# Patient Record
Sex: MALE | Race: WHITE | Employment: OTHER | URBAN - METROPOLITAN AREA
[De-identification: names, ages, dates, MRNs, and addresses within clinical notes are randomized per-mention and may not be internally consistent; named-entity substitution may affect disease eponyms.]

---

## 2017-01-09 ENCOUNTER — ANESTHESIA EVENT (OUTPATIENT)
Dept: SURGERY | Age: 67
End: 2017-01-09
Payer: MEDICARE

## 2017-01-10 ENCOUNTER — SURGERY (OUTPATIENT)
Age: 67
End: 2017-01-10

## 2017-01-10 ENCOUNTER — ANESTHESIA (OUTPATIENT)
Dept: SURGERY | Age: 67
End: 2017-01-10
Payer: MEDICARE

## 2017-01-10 PROBLEM — Z87.19 HISTORY OF BARRETT'S ESOPHAGUS: Status: ACTIVE | Noted: 2017-01-10

## 2017-01-10 PROCEDURE — 74011250636 HC RX REV CODE- 250/636

## 2017-01-10 RX ORDER — PROPOFOL 10 MG/ML
INJECTION, EMULSION INTRAVENOUS AS NEEDED
Status: DISCONTINUED | OUTPATIENT
Start: 2017-01-10 | End: 2017-01-10 | Stop reason: HOSPADM

## 2017-01-10 RX ADMIN — PROPOFOL 50 MG: 10 INJECTION, EMULSION INTRAVENOUS at 14:44

## 2017-01-10 RX ADMIN — PROPOFOL 100 MG: 10 INJECTION, EMULSION INTRAVENOUS at 14:47

## 2017-01-10 RX ADMIN — PROPOFOL 100 MG: 10 INJECTION, EMULSION INTRAVENOUS at 14:41

## 2017-01-10 NOTE — ANESTHESIA PREPROCEDURE EVALUATION
Anesthetic History   No history of anesthetic complications            Review of Systems / Medical History  Patient summary reviewed, nursing notes reviewed and pertinent labs reviewed    Pulmonary  Within defined limits                 Neuro/Psych     seizures (associated with vasovagal episodes only)         Cardiovascular  Within defined limits                Exercise tolerance: >4 METS     GI/Hepatic/Renal     GERD: well controlled    Renal disease: stones       Endo/Other  Within defined limits           Other Findings   Comments: [place]           Physical Exam    Airway  Mallampati: I  TM Distance: 4 - 6 cm  Neck ROM: normal range of motion   Mouth opening: Normal     Cardiovascular    Rhythm: regular  Rate: normal      Pertinent negatives: No murmur   Dental  No notable dental hx       Pulmonary  Breath sounds clear to auscultation               Abdominal  GI exam deferred       Other Findings            Anesthetic Plan    ASA: 2  Anesthesia type: general and total IV anesthesia          Induction: Intravenous  Anesthetic plan and risks discussed with: Patient

## 2017-01-10 NOTE — ANESTHESIA POSTPROCEDURE EVALUATION
Post-Anesthesia Evaluation and Assessment    Patient: Bren Meraz MRN: [id number]  SSN: [id number]    YOB: 1950  Age: 78 y.o. Sex: male       Cardiovascular Function/Vital Signs  Visit Vitals    /77    Pulse (!) 57    Temp 36.6 °C (97.8 °F)    Resp 19    Ht 5' 7\" (1.702 m)    Wt 77.6 kg (171 lb)    SpO2 99%    BMI 26.78 kg/m2       Patient is status post general, total IV anesthesia anesthesia for Procedure(s):  ESOPHAGOGASTRODUODENOSCOPY (EGD) WITH BIOPSY. Nausea/Vomiting: None    Postoperative hydration reviewed and adequate. Pain:  Pain Scale 1: Numeric (0 - 10) (01/10/17 1528)  Pain Intensity 1: 0 (01/10/17 1528)   Managed    Neurological Status:   Neuro (WDL): Exceptions to WDL (01/10/17 1453)  Neuro  Neurologic State: Alert (01/10/17 1528)  LUE Motor Response: Purposeful (01/10/17 1528)  LLE Motor Response: Purposeful (01/10/17 1528)  RUE Motor Response: Purposeful (01/10/17 1528)  RLE Motor Response: Purposeful (01/10/17 1528)   At baseline    Mental Status and Level of Consciousness: Arousable    Pulmonary Status:   O2 Device: Nasal cannula (01/10/17 1456)   Adequate oxygenation and airway patent    Complications related to anesthesia: None    Post-anesthesia assessment completed.  No concerns    Signed By: Thai Kulkarni MD     January 10, 2017

## 2017-01-19 ENCOUNTER — OFFICE VISIT (OUTPATIENT)
Dept: SURGERY | Age: 67
End: 2017-01-19

## 2017-01-19 VITALS — BODY MASS INDEX: 26.84 KG/M2 | WEIGHT: 171 LBS | HEIGHT: 67 IN | RESPIRATION RATE: 20 BRPM

## 2017-01-19 DIAGNOSIS — K40.90 LEFT INGUINAL HERNIA: Primary | ICD-10-CM

## 2017-01-19 NOTE — MR AVS SNAPSHOT
Visit Information Date & Time Provider Department Dept. Phone Encounter #  
 1/19/2017  9:40 AM Ernie Booker MD [facility] [address] [phone] [id number] Upcoming Health Maintenance Date Due Hepatitis C Screening 1950 DTaP/Tdap/Td series (1 - Tdap) 9/12/1970 FOBT Q 1 YEAR AGE 50-75 9/12/1999 ZOSTER VACCINE AGE 60> 9/12/2009 GLAUCOMA SCREENING Q2Y 9/12/2014 Pneumococcal 65+ Low/Medium Risk (1 of 2 - PCV13) 9/12/2014 MEDICARE YEARLY EXAM 9/12/2014 INFLUENZA AGE 9 TO ADULT 8/1/2016 Allergies as of 1/19/2017  Review Complete On: 1/19/2017 By: Ernie Booker MD  
 No Known Allergies Current Immunizations  Never Reviewed No immunizations on file. Not reviewed this visit Vitals Resp Height(growth percentile) Weight(growth percentile) BMI Smoking Status 20 5' 7\" (1.702 m) 171 lb (77.6 kg) 26.78 kg/m2 Never Smoker BMI and BSA Data Body Mass Index Body Surface Area  
 26.78 kg/m 2 1.92 m 2 Preferred Pharmacy Pharmacy Name Phone [id number] [place] [address] [place] [address] [phone] Your Updated Medication List  
  
   
This list is accurate as of: 1/19/17 10:24 AM.  Always use your most recent med list.  
  
  
  
  
 aspirin delayed-release 81 mg tablet Take  by mouth every morning. cholecalciferol 400 unit Tab tablet Commonly known as:  VITAMIN D3 Take  by mouth daily. Citracal + D tablet Generic drug:  calcium citrate-vitamin D3 Take 1 Tab by mouth daily. DHEA PO Take 50 mg by mouth daily. multivitamin tablet Commonly known as:  ONE A DAY Take 1 Tab by mouth daily. omeprazole 20 mg capsule Commonly known as:  PRILOSEC Take 20 mg by mouth every morning. PROLIA 60 mg/mL injection Generic drug:  denosumab 60 mg by SubCUTAneous route. q6 months - 12/2016  
  
 red yeast rice extract 600 mg Cap Take 600 mg by mouth nightly. ZANTAC 150 mg tablet Generic drug:  raNITIdine Take 150 mg by mouth nightly. To-Do List   
 01/24/2017 8:00 AM  
  Appointment with MELISSA PAT ROOM P1 at [address] [place] [address] ([phone]) [facility] SERVICES! Dear Solange Rose: Thank you for requesting a Peeractive account. Our records indicate that you already have an active Peeractive account. You can access your account anytime at [email or web address] Did you know that you can access your hospital and ER discharge instructions at any time in Peeractive? You can also review all of your test results from your hospital stay or ER visit. Additional Information If you have questions, please visit the Frequently Asked Questions section of the Peeractive website at [email or web address] Remember, Peeractive is NOT to be used for urgent needs. For medical emergencies, dial 911. Now available from your iPhone and Android! Please provide this summary of care documentation to your next provider. Your primary care clinician is listed as [address]. If you have any questions after today's visit, please call [phone].

## 2017-01-19 NOTE — PROGRESS NOTES
HISTORY OF PRESENT ILLNESS  Bibi Pacheco is a 78 y.o. male.   HPI Comments:   Treated for Kidney stone by Dr. Argelia Byrd in March    Hx of R IHR    Left groin  Getting bigger    No nausea  Appetite ok  EGD last week with Dr. Sendy Francis +Vincent's, symptoms controlled with PPI          ____________________________________________________________________________  Patient presents with:  Possible Hernia: Seen at the request of Dr. Argelia Byrd for evaluation of possible hernia     Resp 20  Ht 5' 7\" (1.702 m)  Wt 77.6 kg (171 lb)  BMI 26.78 kg/m2  Past Medical History:    Acid indigestion                                              Calculus of kidney                                            GERD (gastroesophageal reflux disease)                        History of Vincent's esophagus                  1/10/2017       Comment:In the past, none 2014    History of kidney stones                        2012            Comment:has surgery 12/2012 and 3/2016    [place] (hard of hearing)                                           Comment:bilateral hearing aides    Osteopenia                                                      Comment:Dr Bina Rey gives him Prolia q6 months    Seizures ([address]                                                  Comment:after syncopal episodes; as of 1/9/17 pt states               this was \"4-5 years ago\"    Syncope, vasovagal                                              Comment:as of 1/9/17 pt states this was \"4-5 years ago\"  Past Surgical History:    HX APPENDECTOMY                                                HX KYPHOPLASTY                                   2011            Comment:[id number]    [address]            Comment:right inguinal    HX LITHOTRIPSY                                   12/2012, *    HX RETINAL DETACHMENT REPAIR                    Right 1981          HX CATARACT REMOVAL                             Bilateral 2015          SD NEUROLOGICAL PROCEDURE UNLISTED Right 06/03/2013      Comment:L4-5 Microdiscectomy  Social History    Marital status:              Spouse name:                       Years of education:                 Number of children:               Social History Main Topics    Smoking status: Never Smoker                                                                Smokeless status: Never Used                        Alcohol use: Yes           4.2 oz/week       7 Cans of beer per week       Comment: as of 1/9/17 pt reports 1 beer daily    Drug use: No              Review of patient's family history indicates:    Heart Disease                  Father                    Malignant Hyperthermia         Neg Hx                    Pseudocholinesterase Deficien* Neg Hx                    Delayed Awakening              Neg Hx                    Post-op Nausea/Vomiting        Neg Hx                    Emergence Delirium             Neg Hx                    Post-op Cognitive Dysfunction  Neg Hx                    Current Outpatient Prescriptions:  omeprazole (PRILOSEC) 20 mg capsule, Take 20 mg by mouth every morning. aspirin delayed-release 81 mg tablet, Take  by mouth every morning.  multivitamin (ONE A DAY) tablet, Take 1 Tab by mouth daily. calcium citrate-vitamin D3 (CITRACAL + D) tablet, Take 1 Tab by mouth daily. cholecalciferol (VITAMIN D3) 400 unit tab tablet, Take  by mouth daily. red yeast rice extract 600 mg cap, Take 600 mg by mouth nightly. denosumab (PROLIA) 60 mg/mL injection, 60 mg by SubCUTAneous route. q6 months - 12/2016  ranitidine (ZANTAC) 150 mg tablet, Take 150 mg by mouth nightly. PRASTERONE, DHEA, (DHEA PO), Take 50 mg by mouth daily. No current facility-administered medications for this visit. Allergies: No Known Allergies  _____________________________________________________________________________      Possible Hernia   The history is provided by the patient. This is a chronic problem.  The current episode started more than 1 week ago. The problem occurs constantly. The problem has been gradually worsening. Pertinent negatives include no chest pain, no abdominal pain, no headaches and no shortness of breath. The symptoms are aggravated by exertion. The symptoms are relieved by rest. The treatment provided no relief. Review of Systems   Constitutional: Negative for chills, fever and weight loss. HENT: Negative for ear pain. Eyes: Negative for pain. Respiratory: Negative for shortness of breath. Cardiovascular: Negative for chest pain. Gastrointestinal: Negative for abdominal pain and blood in stool. Genitourinary: Negative for hematuria. Musculoskeletal: Negative for joint pain. Skin: Negative for rash. Neurological: Negative for dizziness, focal weakness, seizures and headaches. Endo/Heme/Allergies: Does not bruise/bleed easily. Psychiatric/Behavioral: The patient does not have insomnia. Physical Exam   Constitutional: He is oriented to person, place, and time. He appears well-developed and well-nourished. No distress. HENT:   Head: Normocephalic. Mouth/Throat: No oropharyngeal exudate. Eyes: Pupils are equal, round, and reactive to light. No scleral icterus. Neck: Neck supple. No tracheal deviation present. Cardiovascular: Normal rate, regular rhythm and normal heart sounds. Pulmonary/Chest: Effort normal and breath sounds normal. He has no wheezes. Abdominal: Soft. He exhibits no distension and no mass. There is no hepatosplenomegaly. There is no tenderness. There is no CVA tenderness. A hernia is present. Hernia confirmed positive in the left inguinal area. Hernia confirmed negative in the right inguinal area. Musculoskeletal: Normal range of motion. He exhibits no edema. Lymphadenopathy:     He has no cervical adenopathy. Neurological: He is alert and oriented to person, place, and time. Skin: Skin is warm. No rash noted. No erythema.    Psychiatric: He has a normal mood and affect. His behavior is normal.       ASSESSMENT and PLAN    ICD-10-CM ICD-9-CM    1. Left inguinal hernia K40.90 550.90        Rissa Kirkland is having symptoms. I have recommended to him that we proceed with surgery. I had an extensive discussion with him regarding the risks, benefits, and alternatives of proceeding with a Laparoscopic Left, Possible Bilateral Inguinal Hernia Repair with Mesh, Robot Assisted. Risks,benefits, and alternatives were discussed including the risk of anesthesia, bleeding, infection, including mesh infection, chronic orchialgia, neuralgia, other pain syndromes, testicular ischemia, conversion to open, injury to bowel, and recurrence were discussed. I reviewed with Rissa Kirkland his increased risk of bleeding secondary to Asprin use. I have asked him to discontinue the Asprin for 2 days prior to surgery to reduce this risk. He is in agreement to proceed. All questions were answered. Rissa Kirkland will undergo preoperative evaluation and will proceed provided he is medically stable. We will schedule him at his earliest convenience. Thank you for this consult.

## 2017-01-19 NOTE — LETTER
1/19/2017 10:05 AM 
 
Mr. Kenya Chang 87 [address] [address] Surgery Instruction Sheet You have been scheduled for surgery on 1/31/17 at 9;15am at [facility]. Please report to the Surgery Center at 7;15am, this is approximately 2 hours prior to your surgery time. The Surgery Center is located on the [address] [place] [address] Street side of the hospital, just next to the Emergency Room. Reserved parking is available and  parking if lot is full. You will need to have a Pre-op Visit prior to your surgery. Report to the Surgery Center on 1/24/17 at 8:00am .  Bring a list of medications and your insurance cards with you. You may eat/drink prior to this visit. Call your physician immediately if you notice a change in your health between the time you saw your physician and the day of surgery. If you take a blood thinner, please let us know. Call your ordering Doctor to make sure you can stop taking it prior to your surgery. STOP YOUR ASPIRIN 2 DAYS PRIOR TO SURGERY. DO NOT TAKE  IBUPROFEN, ADVIL, MOTRIN, ALEVE, EXCEDRIN, BC POWDER, GOODIES, FISH OIL OR ANY MEDICATION CONTAINING ASPIRIN 5 DAYS PRIOR TO YOUR SURGERY. MAY TAKE TYLENOL. Eat a light dinner the evening before your surgery. DO NOT EAT OR DRINK ANYTHING AFTER MIDNIGHT THE NIGHT BEFORE YOUR SURGERY. This includes water, chewing gum, lifesavers, etc.  The Pre op nurse will check with you about any medication that you may need to take the morning of surgery. Shower with a new bar of anti-bacterial soap (Dial, Safeguard) or solution given to you by Pre-op, the night before surgery. Do not use lotion, powder, deodorant on the skin after showering.   Wear loose, comfortable clothing the day of surgery and bring a container to store your contacts, eyeglasses, dentures, hearing aid, etc.  Do not bring money, valuables, jewelry, etc. to the hospital.   
 
 If you are having outpatient surgery, someone must come with you the morning of surgery to drive you home. You can not drive for 24 hours after any anesthesia. Sometimes it is necessary to stay overnight and leave the next morning. This is still considered outpatient for most insurance deductibles. Someone will still need to drive you home. If you have questions or concerns, please feel free to call /Tana at [phone]. If you need to cancel your surgery, please call as soon as possible. Sincerely, Álvaro Pryor MD

## 2017-01-24 ENCOUNTER — HOSPITAL ENCOUNTER (OUTPATIENT)
Dept: PREADMISSION TESTING | Age: 67
Discharge: HOME | End: 2017-01-24
Attending: SURGERY
Payer: MEDICARE

## 2017-01-24 VITALS
HEART RATE: 57 BPM | TEMPERATURE: 97.9 F | BODY MASS INDEX: 24.88 KG/M2 | OXYGEN SATURATION: 100 % | WEIGHT: 158.51 LBS | DIASTOLIC BLOOD PRESSURE: 71 MMHG | HEIGHT: 67 IN | SYSTOLIC BLOOD PRESSURE: 98 MMHG | RESPIRATION RATE: 18 BRPM

## 2017-01-24 LAB
ANION GAP BLD CALC-SCNC: 7 MMOL/L (ref 5–15)
APPEARANCE UR: CLEAR
ATRIAL RATE: 52 BPM
BACTERIA URNS QL MICRO: NEGATIVE /HPF
BILIRUB UR QL: NEGATIVE
BUN SERPL-MCNC: 12 MG/DL (ref 6–20)
BUN/CREAT SERPL: 12 (ref 12–20)
CALCIUM SERPL-MCNC: 9.1 MG/DL (ref 8.5–10.1)
CALCULATED P AXIS, ECG09: 43 DEGREES
CALCULATED R AXIS, ECG10: -26 DEGREES
CALCULATED T AXIS, ECG11: 40 DEGREES
CHLORIDE SERPL-SCNC: 108 MMOL/L (ref 97–108)
CO2 SERPL-SCNC: 28 MMOL/L (ref 21–32)
COLOR UR: NORMAL
CREAT SERPL-MCNC: 1.03 MG/DL (ref 0.7–1.3)
DIAGNOSIS, 93000: NORMAL
EPITH CASTS URNS QL MICRO: NORMAL /LPF
ERYTHROCYTE [DISTWIDTH] IN BLOOD BY AUTOMATED COUNT: 13.4 % (ref 11.5–14.5)
GLUCOSE SERPL-MCNC: 93 MG/DL (ref 65–100)
GLUCOSE UR STRIP.AUTO-MCNC: NEGATIVE MG/DL
HCT VFR BLD AUTO: 45.5 % (ref 36.6–50.3)
HGB BLD-MCNC: 15.8 G/DL (ref 12.1–17)
HGB UR QL STRIP: NEGATIVE
HYALINE CASTS URNS QL MICRO: NORMAL /LPF (ref 0–5)
KETONES UR QL STRIP.AUTO: NEGATIVE MG/DL
LEUKOCYTE ESTERASE UR QL STRIP.AUTO: NEGATIVE
MCH RBC QN AUTO: 32 PG (ref 26–34)
MCHC RBC AUTO-ENTMCNC: 34.7 G/DL (ref 30–36.5)
MCV RBC AUTO: 92.3 FL (ref 80–99)
NITRITE UR QL STRIP.AUTO: NEGATIVE
P-R INTERVAL, ECG05: 174 MS
PH UR STRIP: 7 [PH] (ref 5–8)
PLATELET # BLD AUTO: 205 K/UL (ref 150–400)
POTASSIUM SERPL-SCNC: 4.1 MMOL/L (ref 3.5–5.1)
PROT UR STRIP-MCNC: NEGATIVE MG/DL
Q-T INTERVAL, ECG07: 452 MS
QRS DURATION, ECG06: 94 MS
QTC CALCULATION (BEZET), ECG08: 420 MS
RBC # BLD AUTO: 4.93 M/UL (ref 4.1–5.7)
RBC #/AREA URNS HPF: NORMAL /HPF (ref 0–5)
SODIUM SERPL-SCNC: 143 MMOL/L (ref 136–145)
SP GR UR REFRACTOMETRY: 1.02 (ref 1–1.03)
UA: UC IF INDICATED,UAUC: NORMAL
UROBILINOGEN UR QL STRIP.AUTO: 1 EU/DL (ref 0.2–1)
VENTRICULAR RATE, ECG03: 52 BPM
WBC # BLD AUTO: 6.8 K/UL (ref 4.1–11.1)
WBC URNS QL MICRO: NORMAL /HPF (ref 0–4)

## 2017-01-24 PROCEDURE — 81001 URINALYSIS AUTO W/SCOPE: CPT | Performed by: SURGERY

## 2017-01-24 PROCEDURE — 93005 ELECTROCARDIOGRAM TRACING: CPT

## 2017-01-24 PROCEDURE — 85027 COMPLETE CBC AUTOMATED: CPT | Performed by: ANESTHESIOLOGY

## 2017-01-24 PROCEDURE — 36415 COLL VENOUS BLD VENIPUNCTURE: CPT | Performed by: ANESTHESIOLOGY

## 2017-01-24 PROCEDURE — 80048 BASIC METABOLIC PNL TOTAL CA: CPT | Performed by: ANESTHESIOLOGY

## 2017-01-24 RX ORDER — CEFAZOLIN SODIUM IN 0.9 % NACL 2 G/100 ML
2 PLASTIC BAG, INJECTION (ML) INTRAVENOUS ONCE
Status: CANCELLED | OUTPATIENT
Start: 2017-01-31 | End: 2017-01-31

## 2017-01-24 RX ORDER — SODIUM CHLORIDE, SODIUM LACTATE, POTASSIUM CHLORIDE, CALCIUM CHLORIDE 600; 310; 30; 20 MG/100ML; MG/100ML; MG/100ML; MG/100ML
25 INJECTION, SOLUTION INTRAVENOUS CONTINUOUS
Status: CANCELLED | OUTPATIENT
Start: 2017-01-31

## 2017-01-24 NOTE — PERIOP NOTES
[facility]  Preoperative Instructions        Surgery Date 01/31/17          Time of Arrival 0715    1. On the day of your surgery, please report to the Surgical Services Registration Desk and sign in at your designated time. The Surgery Center is located to the right of the Emergency Room. 2. You must have someone with you to drive you home. You should not drive a car for 24 hours following surgery. Please make arrangements for a friend or family member to stay with you for the first 24 hours after your surgery. 3. Do not have anything to eat or drink (including water, gum, mints, coffee, juice) after midnight 01/30/17              . This may not apply to medications prescribed by your physician. Please note special instructions, if applicable. If you are currently taking Plavix, Coumadin, or other blood-thinning agents, contact your surgeon for instructions. 4. We recommend you do not drink any alcoholic beverages for 24 hours before and after your surgery. 5. Have a list of all current medications, including vitamins, herbal supplements and any other over the counter medications. Stop all Aspirin and non-steroidal anti-inflammatory drugs (I.e. Advil, Aleve), as directed by your surgeon's office. Stop all vitamins and herbal supplements seven days prior to your surgery. 6. Wear comfortable clothes. Wear glasses instead of contacts. Do not bring any money or jewelry. Please bring picture ID, insurance card, and any prearranged co-payment or hospital payment. Do not wear make-up, particularly mascara the morning of your surgery. Do not wear nail polish, particularly if you are having foot /hand surgery. Wear your hair loose or down, no ponytails, buns, ortega pins or clips. All body piercings must be removed.   Please shower with antibacterial soap for three consecutive days before and on the morning of surgery, but do not apply any lotions, powders or deodorants after the shower on the day of surgery. Please use a fresh towels after each shower. Please sleep in clean clothes and change bed linens the night before surgery. Please do not shave for 48 hours prior to surgery. Shaving of the face is acceptable. 7. You should understand that if you do not follow these instructions your surgery may be cancelled. If your physical condition changes (I.e. fever, cold or flu) please contact your surgeon as soon as possible. 8. It is important that you be on time. If a situation occurs where you may be late, please call [phone] (OR Holding Area). 9. If you have any questions and or problems, please call [phone] (Pre-admission Testing). 10. Your surgery time may be subject to change. You will receive a phone call the evening prior if your time changes. 11.  If having outpatient surgery, you must have someone to drive you here, stay with you during the duration of your stay, and to drive you home at time of discharge. Special Instructions:    MEDICATIONS TO TAKE THE MORNING OF SURGERY WITH A SIP OF WATER: prilosec      I understand a pre-operative phone call will be made to verify my surgery time. In the event that I am not available, I give permission for a message to be left on my answering service and/or with another person?   Yes [phone]         ___________________      __________   _________    (Signature of Patient)             (Witness)                (Date and Time)

## 2017-01-31 ENCOUNTER — ANESTHESIA (OUTPATIENT)
Dept: SURGERY | Age: 67
End: 2017-01-31
Payer: MEDICARE

## 2017-01-31 ENCOUNTER — SURGERY (OUTPATIENT)
Age: 67
End: 2017-01-31

## 2017-01-31 ENCOUNTER — HOSPITAL ENCOUNTER (OUTPATIENT)
Age: 67
Setting detail: OUTPATIENT SURGERY
Discharge: HOME | End: 2017-01-31
Attending: SURGERY | Admitting: SURGERY
Payer: MEDICARE

## 2017-01-31 ENCOUNTER — ANESTHESIA EVENT (OUTPATIENT)
Dept: SURGERY | Age: 67
End: 2017-01-31
Payer: MEDICARE

## 2017-01-31 VITALS
OXYGEN SATURATION: 95 % | TEMPERATURE: 97.8 F | WEIGHT: 169.31 LBS | RESPIRATION RATE: 16 BRPM | HEART RATE: 73 BPM | BODY MASS INDEX: 26.57 KG/M2 | DIASTOLIC BLOOD PRESSURE: 61 MMHG | HEIGHT: 67 IN | SYSTOLIC BLOOD PRESSURE: 114 MMHG

## 2017-01-31 PROCEDURE — 77030018836 HC SOL IRR NACL ICUM -A: Performed by: SURGERY

## 2017-01-31 PROCEDURE — 74011250637 HC RX REV CODE- 250/637

## 2017-01-31 PROCEDURE — 77030011640 HC PAD GRND REM COVD -A: Performed by: SURGERY

## 2017-01-31 PROCEDURE — 76210000022 HC REC RM PH II 1.5 TO 2 HR: Performed by: SURGERY

## 2017-01-31 PROCEDURE — 77030019908 HC STETH ESOPH SIMS -A: Performed by: ANESTHESIOLOGY

## 2017-01-31 PROCEDURE — 74011250636 HC RX REV CODE- 250/636: Performed by: ANESTHESIOLOGY

## 2017-01-31 PROCEDURE — 77030020782 HC GWN BAIR PAWS FLX 3M -B

## 2017-01-31 PROCEDURE — 74011250636 HC RX REV CODE- 250/636

## 2017-01-31 PROCEDURE — 77030020061 HC IV BLD WRMR ADMIN SET 3M -B: Performed by: ANESTHESIOLOGY

## 2017-01-31 PROCEDURE — 77030008684 HC TU ET CUF COVD -B: Performed by: ANESTHESIOLOGY

## 2017-01-31 PROCEDURE — 77030010507 HC ADH SKN DERMBND J&J -B: Performed by: SURGERY

## 2017-01-31 PROCEDURE — 77030020703 HC SEAL CANN DISP INTU -B: Performed by: SURGERY

## 2017-01-31 PROCEDURE — 76210000017 HC OR PH I REC 1.5 TO 2 HR: Performed by: SURGERY

## 2017-01-31 PROCEDURE — 77030008771 HC TU NG SALEM SUMP -A: Performed by: ANESTHESIOLOGY

## 2017-01-31 PROCEDURE — 74011000250 HC RX REV CODE- 250: Performed by: ANESTHESIOLOGY

## 2017-01-31 PROCEDURE — 77030018673: Performed by: SURGERY

## 2017-01-31 PROCEDURE — 74011000250 HC RX REV CODE- 250: Performed by: SURGERY

## 2017-01-31 PROCEDURE — 51798 US URINE CAPACITY MEASURE: CPT

## 2017-01-31 PROCEDURE — 77030026438 HC STYL ET INTUB CARD -A: Performed by: ANESTHESIOLOGY

## 2017-01-31 PROCEDURE — 77030033067 HC SUT PDO STRATFX SPIR J&J -B: Performed by: SURGERY

## 2017-01-31 PROCEDURE — 74011000250 HC RX REV CODE- 250

## 2017-01-31 PROCEDURE — 76010000934 HC OR TIME 1 TO 1.5HR INTENSV - TIER 2: Performed by: SURGERY

## 2017-01-31 PROCEDURE — C1781 MESH (IMPLANTABLE): HCPCS | Performed by: SURGERY

## 2017-01-31 PROCEDURE — 77030032490 HC SLV COMPR SCD KNE COVD -B: Performed by: SURGERY

## 2017-01-31 PROCEDURE — 77030016151 HC PROTCTR LNS DFOG COVD -B: Performed by: SURGERY

## 2017-01-31 PROCEDURE — 77030002933 HC SUT MCRYL J&J -A: Performed by: SURGERY

## 2017-01-31 PROCEDURE — 77030008603 HC TRCR ENDOSC EPATH J&J -C: Performed by: SURGERY

## 2017-01-31 PROCEDURE — 74011250636 HC RX REV CODE- 250/636: Performed by: SURGERY

## 2017-01-31 PROCEDURE — 76060000033 HC ANESTHESIA 1 TO 1.5 HR: Performed by: SURGERY

## 2017-01-31 DEVICE — MESH HERN LAP SELF FIXATING LT ANAT POLYLACTIC ACID PROGRIP: Type: IMPLANTABLE DEVICE | Site: GROIN | Status: FUNCTIONAL

## 2017-01-31 RX ORDER — SODIUM CHLORIDE, SODIUM LACTATE, POTASSIUM CHLORIDE, CALCIUM CHLORIDE 600; 310; 30; 20 MG/100ML; MG/100ML; MG/100ML; MG/100ML
25 INJECTION, SOLUTION INTRAVENOUS CONTINUOUS
Status: DISCONTINUED | OUTPATIENT
Start: 2017-01-31 | End: 2017-01-31 | Stop reason: HOSPADM

## 2017-01-31 RX ORDER — ACETAMINOPHEN 10 MG/ML
INJECTION, SOLUTION INTRAVENOUS AS NEEDED
Status: DISCONTINUED | OUTPATIENT
Start: 2017-01-31 | End: 2017-01-31 | Stop reason: HOSPADM

## 2017-01-31 RX ORDER — MIDAZOLAM HYDROCHLORIDE 1 MG/ML
INJECTION, SOLUTION INTRAMUSCULAR; INTRAVENOUS AS NEEDED
Status: DISCONTINUED | OUTPATIENT
Start: 2017-01-31 | End: 2017-01-31 | Stop reason: HOSPADM

## 2017-01-31 RX ORDER — SODIUM CHLORIDE 0.9 % (FLUSH) 0.9 %
5-10 SYRINGE (ML) INJECTION AS NEEDED
Status: DISCONTINUED | OUTPATIENT
Start: 2017-01-31 | End: 2017-01-31 | Stop reason: HOSPADM

## 2017-01-31 RX ORDER — CEFAZOLIN SODIUM IN 0.9 % NACL 2 G/100 ML
2 PLASTIC BAG, INJECTION (ML) INTRAVENOUS ONCE
Status: COMPLETED | OUTPATIENT
Start: 2017-01-31 | End: 2017-01-31

## 2017-01-31 RX ORDER — SUCCINYLCHOLINE CHLORIDE 20 MG/ML
INJECTION INTRAMUSCULAR; INTRAVENOUS AS NEEDED
Status: DISCONTINUED | OUTPATIENT
Start: 2017-01-31 | End: 2017-01-31 | Stop reason: HOSPADM

## 2017-01-31 RX ORDER — LIDOCAINE HYDROCHLORIDE 20 MG/ML
INJECTION, SOLUTION EPIDURAL; INFILTRATION; INTRACAUDAL; PERINEURAL AS NEEDED
Status: DISCONTINUED | OUTPATIENT
Start: 2017-01-31 | End: 2017-01-31 | Stop reason: HOSPADM

## 2017-01-31 RX ORDER — DEXAMETHASONE SODIUM PHOSPHATE 4 MG/ML
INJECTION, SOLUTION INTRA-ARTICULAR; INTRALESIONAL; INTRAMUSCULAR; INTRAVENOUS; SOFT TISSUE AS NEEDED
Status: DISCONTINUED | OUTPATIENT
Start: 2017-01-31 | End: 2017-01-31 | Stop reason: HOSPADM

## 2017-01-31 RX ORDER — ROCURONIUM BROMIDE 10 MG/ML
INJECTION, SOLUTION INTRAVENOUS AS NEEDED
Status: DISCONTINUED | OUTPATIENT
Start: 2017-01-31 | End: 2017-01-31 | Stop reason: HOSPADM

## 2017-01-31 RX ORDER — OXYCODONE AND ACETAMINOPHEN 5; 325 MG/1; MG/1
1-2 TABLET ORAL
Qty: 40 TAB | Refills: 0 | Status: SHIPPED | OUTPATIENT
Start: 2017-01-31 | End: 2019-12-04 | Stop reason: CLARIF

## 2017-01-31 RX ORDER — DIPHENHYDRAMINE HYDROCHLORIDE 50 MG/ML
12.5 INJECTION, SOLUTION INTRAMUSCULAR; INTRAVENOUS AS NEEDED
Status: DISCONTINUED | OUTPATIENT
Start: 2017-01-31 | End: 2017-01-31 | Stop reason: HOSPADM

## 2017-01-31 RX ORDER — LIDOCAINE HYDROCHLORIDE 10 MG/ML
0.1 INJECTION, SOLUTION EPIDURAL; INFILTRATION; INTRACAUDAL; PERINEURAL AS NEEDED
Status: DISCONTINUED | OUTPATIENT
Start: 2017-01-31 | End: 2017-01-31 | Stop reason: HOSPADM

## 2017-01-31 RX ORDER — SODIUM CHLORIDE 0.9 % (FLUSH) 0.9 %
5-10 SYRINGE (ML) INJECTION EVERY 8 HOURS
Status: DISCONTINUED | OUTPATIENT
Start: 2017-01-31 | End: 2017-01-31 | Stop reason: HOSPADM

## 2017-01-31 RX ORDER — GLYCOPYRROLATE 0.2 MG/ML
INJECTION INTRAMUSCULAR; INTRAVENOUS AS NEEDED
Status: DISCONTINUED | OUTPATIENT
Start: 2017-01-31 | End: 2017-01-31 | Stop reason: HOSPADM

## 2017-01-31 RX ORDER — BUPIVACAINE HYDROCHLORIDE AND EPINEPHRINE 5; 5 MG/ML; UG/ML
30 INJECTION, SOLUTION EPIDURAL; INTRACAUDAL; PERINEURAL ONCE
Status: COMPLETED | OUTPATIENT
Start: 2017-01-31 | End: 2017-01-31

## 2017-01-31 RX ORDER — HYDROMORPHONE HYDROCHLORIDE 1 MG/ML
0.2 INJECTION, SOLUTION INTRAMUSCULAR; INTRAVENOUS; SUBCUTANEOUS
Status: DISCONTINUED | OUTPATIENT
Start: 2017-01-31 | End: 2017-01-31 | Stop reason: HOSPADM

## 2017-01-31 RX ORDER — OXYCODONE AND ACETAMINOPHEN 5; 325 MG/1; MG/1
1 TABLET ORAL ONCE
Status: COMPLETED | OUTPATIENT
Start: 2017-01-31 | End: 2017-01-31

## 2017-01-31 RX ORDER — PHENYLEPHRINE HCL IN 0.9% NACL 0.4MG/10ML
SYRINGE (ML) INTRAVENOUS AS NEEDED
Status: DISCONTINUED | OUTPATIENT
Start: 2017-01-31 | End: 2017-01-31 | Stop reason: HOSPADM

## 2017-01-31 RX ORDER — FENTANYL CITRATE 50 UG/ML
INJECTION, SOLUTION INTRAMUSCULAR; INTRAVENOUS AS NEEDED
Status: DISCONTINUED | OUTPATIENT
Start: 2017-01-31 | End: 2017-01-31 | Stop reason: HOSPADM

## 2017-01-31 RX ORDER — FENTANYL CITRATE 50 UG/ML
25 INJECTION, SOLUTION INTRAMUSCULAR; INTRAVENOUS
Status: DISCONTINUED | OUTPATIENT
Start: 2017-01-31 | End: 2017-01-31 | Stop reason: HOSPADM

## 2017-01-31 RX ORDER — ONDANSETRON 2 MG/ML
INJECTION INTRAMUSCULAR; INTRAVENOUS AS NEEDED
Status: DISCONTINUED | OUTPATIENT
Start: 2017-01-31 | End: 2017-01-31 | Stop reason: HOSPADM

## 2017-01-31 RX ORDER — OXYCODONE AND ACETAMINOPHEN 5; 325 MG/1; MG/1
TABLET ORAL
Status: COMPLETED
Start: 2017-01-31 | End: 2017-01-31

## 2017-01-31 RX ORDER — PROPOFOL 10 MG/ML
INJECTION, EMULSION INTRAVENOUS AS NEEDED
Status: DISCONTINUED | OUTPATIENT
Start: 2017-01-31 | End: 2017-01-31 | Stop reason: HOSPADM

## 2017-01-31 RX ORDER — GLYCOPYRROLATE 0.2 MG/ML
INJECTION INTRAMUSCULAR; INTRAVENOUS
Status: DISCONTINUED
Start: 2017-01-31 | End: 2017-01-31 | Stop reason: HOSPADM

## 2017-01-31 RX ORDER — DOCUSATE SODIUM 100 MG/1
100 CAPSULE, LIQUID FILLED ORAL 2 TIMES DAILY
Qty: 30 CAP | Refills: 2 | Status: SHIPPED | OUTPATIENT
Start: 2017-01-31 | End: 2017-02-14

## 2017-01-31 RX ORDER — GLYCOPYRROLATE 0.2 MG/ML
0.1 INJECTION INTRAMUSCULAR; INTRAVENOUS
Status: COMPLETED | OUTPATIENT
Start: 2017-01-31 | End: 2017-01-31

## 2017-01-31 RX ORDER — NEOSTIGMINE METHYLSULFATE 1 MG/ML
INJECTION INTRAVENOUS AS NEEDED
Status: DISCONTINUED | OUTPATIENT
Start: 2017-01-31 | End: 2017-01-31 | Stop reason: HOSPADM

## 2017-01-31 RX ADMIN — GLYCOPYRROLATE 0.1 MG: 0.2 INJECTION, SOLUTION INTRAMUSCULAR; INTRAVENOUS at 11:10

## 2017-01-31 RX ADMIN — MIDAZOLAM HYDROCHLORIDE 2 MG: 1 INJECTION, SOLUTION INTRAMUSCULAR; INTRAVENOUS at 08:29

## 2017-01-31 RX ADMIN — LIDOCAINE HYDROCHLORIDE 60 MG: 20 INJECTION, SOLUTION EPIDURAL; INFILTRATION; INTRACAUDAL; PERINEURAL at 08:36

## 2017-01-31 RX ADMIN — CEFAZOLIN 2 G: 10 INJECTION, POWDER, FOR SOLUTION INTRAVENOUS; PARENTERAL at 08:30

## 2017-01-31 RX ADMIN — OXYCODONE HYDROCHLORIDE AND ACETAMINOPHEN 1 TABLET: 5; 325 TABLET ORAL at 18:10

## 2017-01-31 RX ADMIN — OXYCODONE AND ACETAMINOPHEN 1 TABLET: 5; 325 TABLET ORAL at 18:10

## 2017-01-31 RX ADMIN — SODIUM CHLORIDE, SODIUM LACTATE, POTASSIUM CHLORIDE, AND CALCIUM CHLORIDE 25 ML/HR: 600; 310; 30; 20 INJECTION, SOLUTION INTRAVENOUS at 08:08

## 2017-01-31 RX ADMIN — EPHEDRINE SULFATE 10 MG: 50 INJECTION INTRAMUSCULAR; INTRAVENOUS; SUBCUTANEOUS at 10:50

## 2017-01-31 RX ADMIN — ACETAMINOPHEN 1000 MG: 10 INJECTION, SOLUTION INTRAVENOUS at 09:03

## 2017-01-31 RX ADMIN — ROCURONIUM BROMIDE 30 MG: 10 INJECTION, SOLUTION INTRAVENOUS at 08:53

## 2017-01-31 RX ADMIN — PROPOFOL 150 MG: 10 INJECTION, EMULSION INTRAVENOUS at 08:36

## 2017-01-31 RX ADMIN — NEOSTIGMINE METHYLSULFATE 3 MG: 1 INJECTION INTRAVENOUS at 09:32

## 2017-01-31 RX ADMIN — FENTANYL CITRATE 25 MCG: 50 INJECTION, SOLUTION INTRAMUSCULAR; INTRAVENOUS at 10:13

## 2017-01-31 RX ADMIN — ROCURONIUM BROMIDE 5 MG: 10 INJECTION, SOLUTION INTRAVENOUS at 08:36

## 2017-01-31 RX ADMIN — ONDANSETRON 4 MG: 2 INJECTION INTRAMUSCULAR; INTRAVENOUS at 09:02

## 2017-01-31 RX ADMIN — SODIUM CHLORIDE, SODIUM LACTATE, POTASSIUM CHLORIDE, AND CALCIUM CHLORIDE: 600; 310; 30; 20 INJECTION, SOLUTION INTRAVENOUS at 09:30

## 2017-01-31 RX ADMIN — Medication 120 MCG: at 08:42

## 2017-01-31 RX ADMIN — SUCCINYLCHOLINE CHLORIDE 140 MG: 20 INJECTION INTRAMUSCULAR; INTRAVENOUS at 08:36

## 2017-01-31 RX ADMIN — FENTANYL CITRATE 50 MCG: 50 INJECTION, SOLUTION INTRAMUSCULAR; INTRAVENOUS at 09:36

## 2017-01-31 RX ADMIN — DEXAMETHASONE SODIUM PHOSPHATE 8 MG: 4 INJECTION, SOLUTION INTRA-ARTICULAR; INTRALESIONAL; INTRAMUSCULAR; INTRAVENOUS; SOFT TISSUE at 08:45

## 2017-01-31 RX ADMIN — FENTANYL CITRATE 100 MCG: 50 INJECTION, SOLUTION INTRAMUSCULAR; INTRAVENOUS at 08:36

## 2017-01-31 RX ADMIN — GLYCOPYRROLATE 0.5 MG: 0.2 INJECTION INTRAMUSCULAR; INTRAVENOUS at 09:32

## 2017-01-31 RX ADMIN — BUPIVACAINE HYDROCHLORIDE AND EPINEPHRINE BITARTRATE 10 ML: 5; .005 INJECTION, SOLUTION EPIDURAL; INTRACAUDAL; PERINEURAL at 09:30

## 2017-01-31 NOTE — PERIOP NOTES
notified of low HR (40-45 @ times). Order rec'd for Ephedrine 10mg IV now & if HR remains <55,give 0.2 Glycopyrolate IV.

## 2017-01-31 NOTE — ANESTHESIA PREPROCEDURE EVALUATION
Anesthetic History   No history of anesthetic complications            Review of Systems / Medical History  Patient summary reviewed, nursing notes reviewed and pertinent labs reviewed    Pulmonary  Within defined limits                 Neuro/Psych     seizures (associated with vasovagal episodes only)         Cardiovascular  Within defined limits                Exercise tolerance: >4 METS     GI/Hepatic/Renal     GERD: well controlled    Renal disease: stones       Endo/Other  Within defined limits           Other Findings   Comments: [place]  Cholecystitis            Physical Exam    Airway  Mallampati: I  TM Distance: 4 - 6 cm  Neck ROM: normal range of motion   Mouth opening: Normal     Cardiovascular    Rhythm: regular  Rate: normal      Pertinent negatives: No murmur   Dental  No notable dental hx       Pulmonary  Breath sounds clear to auscultation               Abdominal  GI exam deferred       Other Findings            Anesthetic Plan    ASA: 2  Anesthesia type: general          Induction: Intravenous  Anesthetic plan and risks discussed with: Patient

## 2017-01-31 NOTE — IP AVS SNAPSHOT
[address] [facility] 
[phone] Patient: Mariah Bergman MRN: [id number] You are allergic to the following No active allergies Recent Documentation Height Weight BMI Smoking Status 1.702 m 76.8 kg 26.52 kg/m2 Never Smoker Emergency Contacts Name Discharge Info Relation Home Work Mobile Ese Arnold DISCHARGE CAREGIVER [3] Spouse [3] [phone] About your hospitalization You were admitted on:  January 31, 2017 You last received care in the:  [facility] PACU You were discharged on:  January 31, 2017 Unit phone number:  [phone] Why you were hospitalized Your primary diagnosis was:  Not on File Providers Seen During Your Hospitalizations Provider Role Specialty Primary office phone Brooke Reyes MD Attending Provider General Surgery [phone] Your Primary Care Physician (PCP) Primary Care Physician Office Phone Office Fax Leroy Ignacio [phone] Follow-up Information Follow up With Details Comments Contact Info [address] [facility], [address] [place] [address] 
[phone] Your Appointments Thursday February 16, 2017  9:00 AM EST  
POST OP 10 MIN with Brooke Reyes MD  
[facility] Dr. Cristobal [facility] ([address]) [address] [place] [address], [address] [place] [address]  
[phone] Current Discharge Medication List  
  
START taking these medications Dose & Instructions Dispensing Information Comments Morning Noon Evening Bedtime  
 docusate sodium 100 mg capsule Commonly known as:  Melia Vines Your next dose is: Today, Tomorrow Other:  _________ Dose:  100 mg Take 1 Cap by mouth two (2) times a day for 14 days. Stop taking if you have diarrhea Quantity:  30 Cap Refills:  2  
     
   
   
   
  
 oxyCODONE-acetaminophen 5-325 mg per tablet Commonly known as:  PERCOCET Your next dose is: Today, Tomorrow Other:  _________ Dose:  1-2 Tab Take 1-2 Tabs by mouth every four (4) hours as needed for Pain. Max Daily Amount: 12 Tabs. Quantity:  40 Tab Refills:  0 CONTINUE these medications which have NOT CHANGED Dose & Instructions Dispensing Information Comments Morning Noon Evening Bedtime  
 aspirin delayed-release 81 mg tablet Your next dose is: Today, Tomorrow Other:  _________ Take  by mouth every morning. Refills:  0  
     
   
   
   
  
 cholecalciferol 400 unit Tab tablet Commonly known as:  VITAMIN D3 Your next dose is: Today, Tomorrow Other:  _________ Dose:  400 Units Take 400 Units by mouth two (2) times a day. Refills:  0 Citracal + D tablet Generic drug:  calcium citrate-vitamin D3 Your next dose is: Today, Tomorrow Other:  _________ Dose:  1 Tab Take 1 Tab by mouth daily. Refills:  0 DHEA PO Your next dose is: Today, Tomorrow Other:  _________ Dose:  50 mg Take 50 mg by mouth daily. Refills:  0  
     
   
   
   
  
 multivitamin tablet Commonly known as:  ONE A DAY Your next dose is: Today, Tomorrow Other:  _________ Dose:  1 Tab Take 1 Tab by mouth daily. Refills:  0  
     
   
   
   
  
 omeprazole 20 mg capsule Commonly known as:  PRILOSEC Your next dose is: Today, Tomorrow Other:  _________ Dose:  20 mg Take 20 mg by mouth every morning. Refills:  0 PROLIA 60 mg/mL injection Generic drug:  denosumab Your next dose is: Today, Tomorrow Other:  _________ Dose:  60 mg  
60 mg by SubCUTAneous route. q6 months - 12/2016 Refills:  0  
     
   
   
   
  
 red yeast rice extract 600 mg Cap Your next dose is: Today, Tomorrow Other:  _________ Dose:  1800 mg Take 1,800 mg by mouth nightly. Refills:  0  
     
   
   
   
  
 ZANTAC 150 mg tablet Generic drug:  raNITIdine Your next dose is: Today, Tomorrow Other:  _________ Dose:  150 mg Take 150 mg by mouth nightly. Refills:  0 Where to Get Your Medications These medications were sent to [id number] [place], [address]  [address], [address] Phone:  [phone]  
  docusate sodium 100 mg capsule Information on where to get these meds will be given to you by the nurse or doctor. ! Ask your nurse or doctor about these medications  
  oxyCODONE-acetaminophen 5-325 mg per tablet Discharge Instructions Dr. Britney Flood Discharge Instructions for:  Mariza Ruiz MRN: [id number] : 1950 Admitted: 2017  Discharged: 2017 ** ok to restart aspirin on 2017 ** What to do at [facility] Recommended diet: Resume your usual diet Recommended activity: Lift less than 10 lbs for 4 weeks. Follow-up with Dr. Sravan Moya in 2-3 weeks. Call [phone] for an appointment. Inguinal Hernia Repair: What to Expect at Home Your Recovery You are likely to have pain for the next few days. You may also feel like you have the flu, and you may have a low fever and feel tired and nauseated. This is common. You should feel better after a few days and will probably feel much better in 7 days. For several weeks you may feel twinges or pulling in the hernia repair when you move. You may have some bruising on the scrotum and along the penis.  This is normal. 
 
Men will need to wear a jockstrap or briefs, not boxers, for scrotal support for several days after a groin (inguinal) hernia repair. [employer] bicycle shorts may provide good support. This care sheet gives you a general idea about how long it will take for you to recover. But each person recovers at a different pace. Follow the steps below to get better as quickly as possible. How can you care for yourself at home? Activity Rest when you feel tired. Getting enough sleep will help you recover. Sleep with your head up by using three or four pillows. You can also try to sleep with your head up in a recliner chair. Try to walk each day. Start by walking a little more than you did the day before. Bit by bit, increase the amount you walk. Walking boosts blood flow and helps prevent pneumonia and constipation. Put ice or a cold pack on the area of your hernia repair for 10 to 15 minutes at a time. Try to do this every 1 to 2 hours for the first 24 hours (when you are awake) or until the swelling goes down. Put a thin cloth between the ice and your skin. Avoid strenuous activities, such as biking, jogging, weight lifting, or aerobic exercise, until your doctor says it is okay. Avoid lifting anything that would make you strain. This may include heavy grocery bags and milk containers, a heavy briefcase or backpack, cat litter or dog food bags, a child, or a vacuum . You may drive when you are no longer taking pain medicine and can quickly move your foot from the gas pedal to the brake. You must also be able to sit comfortably for a long period of time, even if you do not plan to go far. You might get caught in traffic. Most people are able to return to work within 1 to 2 weeks after surgery. You may shower. Pat the cut (incision) dry. Do not take a bath for 2 weeks. You can have sex again when it feels comfortable to do so. Diet You can eat your normal diet. If your stomach is upset, try bland, low-fat foods like plain rice, broiled chicken, toast, and yogurt. Drink plenty of fluids (unless your doctor tells you not to). You may notice that your bowel movements are not regular right after your surgery. This is common. Avoid constipation and straining with bowel movements. You may want to take a fiber supplement every day. If you have not had a bowel movement after a couple of days, ask your doctor about taking a mild laxative. Medicines Take pain medicines exactly as directed. If the doctor gave you a prescription medicine for pain, take it as prescribed. If you are not taking a prescription pain medicine, take an over-the-counter medicine such as acetaminophen (Tylenol), ibuprofen (Advil, Motrin), or naproxen (Aleve). Read and follow all instructions on the label. Do not take two or more pain medicines at the same time unless the doctor told you to. Many pain medicines have acetaminophen, which is Tylenol. Too much acetaminophen (Tylenol) can be harmful. If your doctor prescribed antibiotics, take them as directed. Do not stop taking them just because you feel better. You need to take the full course of antibiotics. If you think your pain medicine is making you sick to your stomach: Take your medicine after meals (unless your doctor has told you not to). Ask your doctor for a different pain medicine. Incision care If you have strips of tape on the cut (incision) the doctor made, leave the tape on for a week or until it falls off. If you have staples closing the cut, you will need to visit your doctor in 1 to 2 weeks to have them removed. Wash the area daily with warm, soapy water and pat it dry. Follow-up care is a key part of your treatment and safety. Be sure to make and go to all appointments, and call your doctor if you are having problems. Its also a good idea to know your test results and keep a list of the medicines you take. When should you call for help? Call 911 anytime you think you may need emergency care. For example, call if: You pass out (lose consciousness). You have sudden chest pain and shortness of breath, or you cough up blood. You have severe pain in your belly. Call your doctor now or seek immediate medical care if: 
You are sick to your stomach and cannot keep fluids down. You have signs of a blood clot, such as: 
Pain in your calf, back of the knee, thigh, or groin. Redness and swelling in your leg or groin. You have trouble passing urine or stool, especially if you have mild pain or swelling in your lower belly. You have hot flashes, sweating, flushing, or a fast or pounding heartbeat. Bright red blood has soaked through the bandage over your incision. Watch closely for any changes in your health, and be sure to contact your doctor if: 
Your swelling is getting worse. Your swelling is not going down. How to Care for Your Wound After Its Treated With DERMABOND* Topical Skin Adhesive DERMABOND* Topical Skin Adhesive (2-octyl cyanoacrylate) is a sterile, liquid skin adhesive 
that holds wound edges together. The film will usually remain in place for 5 to 10 days, then 
naturally fall off your skin. The following will answer some of your questions and provide instructions for proper care for your 
wound while it is healing: CHECK WOUND APPEARANCE 
 Some swelling, redness, and pain are common with all wounds and normally will go away as the 
wound heals. If swelling, redness, or pain increases or if the wound feels warm to the touch, 
contact a doctor. Also contact a doctor if the wound edges reopen or separate. REPLACE BANDAGES 
 If your wound is bandaged, keep the bandage dry.  Replace the dressing daily until the adhesive film has fallen off or if the 
bandage should become wet, unless otherwise instructed by your 
physician.  When changing the dressing, do not place tape directly over the DERMABOND adhesive film, because removing the tape later may also 
remove the film. AVOID TOPICAL MEDICATIONS  Do not apply liquid or ointment medications or any other product to your wound while the DERMABOND adhesive film is in place. These may loosen the film before your wound is healed. KEEP WOUND DRY AND PROTECTED  You may occasionally and briefly wet your wound in the shower or bath. Do not soak or scrub 
your wound, do not swim, and avoid periods of heavy perspiration until the DERMABOND 
adhesive has naturally fallen off. After showering or bathing, gently blot your wound dry with a 
soft towel. If a protective dressing is being used, apply a fresh, dry bandage, being sure to keep 
the tape off the DERMABOND adhesive film.  Apply a clean, dry bandage over the wound if necessary to protect it.  Protect your wound from injury until the skin has had sufficient time to heal. 
 Do not scratch, rub, or pick at the DERMABOND adhesive film. This may loosen the film before 
your wound is healed.  Protect the wound from prolonged exposure to sunlight or tanning lamps while the film is in 
place. If you have any questions or concerns about this product, please consult your doctor. *[employer]ark ©[employer] 2002 A common side effect of anesthesia following surgery is nausea and/or vomiting. In order to decrease symptoms, it is wise to avoid foods that are high in fat, greasy foods, milk products, and spicy foods for the first 24 hours. Acceptable foods for the first 24 hours following surgery include but are not limited to: 
 
? soup 
? broth 
?  toast  
? crackers ? applesauce 
? bananas  
? mashed potatoes, 
? soft or scrambled eggs 
? oatmeal 
?  jello It is important to eat when taking your pain medication. This will help to prevent nausea. If possible, please try to time your meals with your medications. It is very important to stay hydrated following surgery. Sip fluids frequently while awake.  Avoid acidic drinks such as citrus juices and soda for 24 hours. Carbonated beverages may cause bloating and gas. Acceptable fluids include: 
 
? water (flavor packets may add variety) ? coffee or tea (in moderation) ? Gatorade ? Vermell Kanaris ? apple juice 
? cranberry juice You are encouraged to cough and deep breathe every hour when awake. This will help to prevent respiratory complications following anesthesia. You may want to hug a pillow when coughing and sneezing to add additional support to the surgical area and to decrease discomfort if you had abdominal or chest surgery. If you are discharged home with support stockings, you may remove them after 24 hours. Support stockings are used to help prevent blood clots in the legs following surgery. Please take time to review all of your Home Care Instructions and Medication Information sheets provided in your discharge packet. If you have any questions, please contact your surgeons office. Thank you. DISCHARGE SUMMARY from Nurse The following personal items are in your possession at time of discharge: 
 
Dental Appliances: None Home Medications: None Jewelry: None Clothing: Footwear, Shirt, Pants, Socks, Undergarments Other Valuables: Derl Scot PATIENT INSTRUCTIONS: 
 
 
F-face looks uneven A-arms unable to move or move unevenly S-speech slurred or non-existent T-time-call 911 as soon as signs and symptoms begin-DO NOT go Back to bed or wait to see if you get better-TIME IS BRAIN. Warning Signs of HEART ATTACK Call 911 if you have these symptoms: 
? Chest discomfort. Most heart attacks involve discomfort in the center of the chest that lasts more than a few minutes, or that goes away and comes back. It can feel like uncomfortable pressure, squeezing, fullness, or pain. ? Discomfort in other areas of the upper body.  Symptoms can include pain or discomfort in one or both arms, the back, neck, jaw, or stomach. ? Shortness of breath with or without chest discomfort. ? Other signs may include breaking out in a cold sweat, nausea, or lightheadedness. Don't wait more than five minutes to call [address]! Fast action can save your life. Calling 911 is almost always the fastest way to get lifesaving treatment. Emergency Medical Services staff can begin treatment when they arrive  up to an hour sooner than if someone gets to the hospital by car. The discharge information has been reviewed with the {PATIENT/Caregiver). The {PATIENT/Caregiver) verbalized understanding. Discharge medications reviewed with the (Dishcarge meds reviewed [id number]) and appropriate educational materials and side effects teaching were provided. Oxycodone/Acetaminophen (By mouth) Acetaminophen (c-cuvd-v-MIN-oh-fen), Oxycodone Hydrochloride (pz-x-CXL-done jo-droe-KLOR-floyd) Treats moderate to moderately severe pain. This medicine is a narcotic pain reliever. Brand Name(s):Endocet, Percocet, Primlev, Roxicet, Xartemis XR There may be other brand names for this medicine. When This Medicine Should Not Be Used: This medicine is not right for everyone. Do not use it if you had an allergic reaction to acetaminophen or oxycodone, or if you have serious breathing problems (such as severe asthma, hypercarbia, respiratory depression) or paralytic ileus. How to Use This Medicine:  
Capsule, Liquid, Tablet, Long Acting Tablet · Your doctor will tell you how much medicine to use. Do not use more than directed. · Measure the oral liquid medicine with a marked measuring spoon, oral syringe, or medicine cup. · Swallow the extended-release tablet whole. Do not crush, break, or chew it. Do not lick or wet the tablet before placing it in your mouth. Do not give this medicine through a feeding tube. · This medicine should come with a Medication Guide. Ask your pharmacist for a copy if you do not have one. · Store the medicine in a closed container at room temperature, away from heat, moisture, and direct light. Ask your pharmacist about the best way to dispose of medicine you do not use. Drugs and Foods to Avoid: Ask your doctor or pharmacist before using any other medicine, including over-the-counter medicines, vitamins, and herbal products. · Do not use Xartemis XR if you have used an MAO inhibitor in the past 14 days. · Some medicines and foods can affect how this medicine works. Tell your doctor if you are taking any of the following: ¨ Butorphanol, lamotrigine, nalbuphine, naltrexone, pentazocine, propranolol, zidovudine ¨ Birth control pills, a diuretic (water pill), muscle relaxants, a phenothiazine medicine (chlorpromazine, perphenazine, promethazine, prochlorperazine, thioridazine) · Do not drink alcohol while you are using this medicine. Acetaminophen can damage your liver, and alcohol can increase this risk. Do not take acetaminophen without asking your doctor if you have 3 or more drinks of alcohol every day. · Tell your doctor if you are using any medicine that makes you sleepy, such as allergy medicine or other narcotic pain medicine. Warnings While Using This Medicine: · Tell your doctor if you are pregnant, or if you have kidney disease, liver disease, heart disease, low blood pressure, breathing problems or lung disease, especially if you have asthma, COPD, cor pulmonale, or kyphoscoliosis (curved spine that causes breathing trouble). Tell your doctor if you have urination problems, an underactive thyroid, David disease, pancreas or prostate problems, or a stomach disorder. Tell your doctor if you have a history of head injury or brain damage, seizures, or alcohol or drug abuse. Tell your doctor if you are allergic to codeine. · Do not breastfeed while you are using this medicine, unless otherwise directed by your doctor. · This medicine may cause the following problems: 
¨ Liver problems ¨ Serious skin reactions ¨ Low blood pressure · If you take this medicine for more than a few weeks, do not stop taking it suddenly. Your doctor will need to slowly decrease your dose before you stop it completely. · Get emergency help immediately if you think you may have taken too much of this medicine. Signs of an overdose include shallow breathing, fainting, confusion, nausea, vomiting, pinpoint pupils of the eyes, or pale or blue lips, fingernails, or skin. · This medicine may make you dizzy or drowsy. Do not drive or do anything that could be dangerous until you know how this medicine affects you. · This medicine contains acetaminophen. Read the labels of all other medicines you are using to see if they also contain acetaminophen, or ask your doctor or pharmacist. Elena Amel not use more than 4 grams (4,000 milligrams) total of acetaminophen in one day. · This medicine can be habit-forming. Do not use more than your prescribed dose. Call your doctor if you think your medicine is not working. · This medicine may cause constipation, especially with long-term use. Ask your doctor if you should use a laxative to prevent and treat constipation. · Keep all medicine out of the reach of children. Never share your medicine with anyone. Possible Side Effects While Using This Medicine:  
Call your doctor right away if you notice any of these side effects: · Allergic reaction: Itching or hives, swelling in your face or hands, swelling or tingling in your mouth or throat, chest tightness, trouble breathing · Dark urine or pale stools, nausea, vomiting, loss of appetite, stomach pain, yellow skin or eyes · Extreme weakness, shallow breathing, uneven heartbeat, seizures, sweating, or cold or clammy skin · Lightheadedness, dizziness, or fainting · Trouble breathing If you notice these less serious side effects, talk with your doctor: · Constipation · Headache · Mild lightheadedness, sleepiness, or drowsiness · Mild nausea or vomiting If you notice other side effects that you think are caused by this medicine, tell your doctor. Call your doctor for medical advice about side effects. You may report side effects to FDA at 8-629-FDA-7294 © 2016 [address] is for End User's use only and may not be sold, redistributed or otherwise used for commercial purposes. The above information is an  only. It is not intended as medical advice for individual conditions or treatments. Talk to your doctor, nurse or pharmacist before following any medical regimen to see if it is safe and effective for you. Discharge Orders None [facility] SERVICES! Dear Pastor Santos: Thank you for requesting a [employer] account. Our records indicate that you already have an active [employer] account. You can access your account anytime at [email or web address] Did you know that you can access your hospital and ER discharge instructions at any time in [employer]? You can also review all of your test results from your hospital stay or ER visit. Additional Information If you have questions, please visit the Frequently Asked Questions section of the [employer] website at [email or web address] Remember, [employer] is NOT to be used for urgent needs. For medical emergencies, dial 911. Now available from your iPhone and Android! General Information Please provide this summary of care documentation to your next provider. Patient Signature:  ____________________________________________________________ Date:  ____________________________________________________________  
  
Barb Sprague  Provider Signature: ____________________________________________________________ Date:  ____________________________________________________________

## 2017-01-31 NOTE — OP NOTES
OPERATIVE REPORT      PREOPERATIVE DIAGNOSES:    left inguinal hernia. POSTOPERATIVE DIAGNOSES:   large LEFT direct and indirect inguinal hernias. OPERATIVE PROCEDURE:  1. Laparoscopic repair of LEFT inguinal hernia with mesh, robot assisted. SURGEON:  Jaskaran Berg MD    ANESTHESIA:  General plus local.    SPECIMEN:  None. COMPLICATIONS:  None. ESTIMATED BLOOD LOSS:  Minimal.    INDICATION FOR PROCEDURE: Rissa Kirkland is a 78 y.o. male presented to the office with groin pain. On exam, he was noted to have a hernia. He is brought to the operating room for repair. Risks, benefits and alternatives were discussed. Consent form was placed in the chart. DESCRIPTION OF PROCEDURE: The patient was brought to operating room number  1, with consent form signed and on the chart and the site of surgery marked. After satisfactory induction of general anesthesia, the patient was kept in the supine position, bilateral arms tucked to his sides, with appropriate padding. The patient's abdomen was prepped and draped sterilely, including use of Ioban. After appropriate time-out was held, the skin was infused with local anesthetic, an incision was made on the Left abdomen and a 5 mm Optiview trocar was placed intra-abdominally under visualization, and pneumoperitoneum was achieved. The abdomen was explored, with findings noted above. An 8 mm robotic port was placed in the Right side and an 8.5 mm robotic port placed at the umbilicus. The 5 mm port replaced with an 8 mm robotic port. The patient was placed in Trendelenburg position, the robot was docked and robotic instruments were inserted. The abdomen was explored, with findings noted as above. The peritoneum was incised, and the peritoneal flap was created on the Left. This was carried down towards Rafita's ligament medially and the abdominal side wall laterally. Appropriate landmarks were identified.   The cord structures were dissected free of the peritoneum, and the peritoneum was taken down beyond the splaying of the cord. Once dissection was completed, a Left-sided self-fixating mesh was  inserted and set into position. It was noted to be lying appropriately and in good position. Hemostasis was ensured. A 2-0 barbed absorbable suture was used to reapproximate the peritoneum. Once completed, the ports were removed sequentially under visualization, and the pneumoperitoneum was allowed to escape. Additional local anesthetic was injected in the periumbilical fascia. A figure-of-eight 0 Vicryl suture was used to close the periumbilical fascia. Additional Vicryl was placed in the soft tissue, and all skin incisions were closed with subcuticular Monocryl and  Dermabond. The patient remained stable during my presence in the operating room.       Elisabeth Taylor MD

## 2017-01-31 NOTE — ANESTHESIA POSTPROCEDURE EVALUATION
Post-Anesthesia Evaluation and Assessment    Patient: Moshe Cook MRN: [id number]  SSN: [id number]    YOB: 1950  Age: 78 y.o. Sex: male       Cardiovascular Function/Vital Signs  Visit Vitals    /70    Pulse (!) 42    Temp 36.3 °C (97.4 °F)    Resp 12    Ht 5' 7\" (1.702 m)    Wt 76.8 kg (169 lb 5 oz)    SpO2 98%    BMI 26.52 kg/m2       Patient is status post general anesthesia for Procedure(s):  ROBOTIC ASSISTED LAPAROSCOPIC LEFT INGUINAL HERNIA REPAIR WITH MESH. Nausea/Vomiting: None    Postoperative hydration reviewed and adequate. Pain:  Pain Scale 1: FLACC (01/31/17 1020)  Pain Intensity 1: 0 (01/31/17 1020)   Managed    Neurological Status:   Neuro (WDL): Exceptions to WDL (01/31/17 4813)  Neuro  Neurologic State: Drowsy; Eyes open to stimulus (01/31/17 0947)  Orientation Level: Oriented to person;Oriented to place;Oriented to situation (01/31/17 0947)  Cognition: Follows commands (01/31/17 0947)  Speech: Clear (01/31/17 0947)  LUE Motor Response: Purposeful (01/31/17 0947)  LLE Motor Response: Purposeful (01/31/17 0947)  RUE Motor Response: Purposeful (01/31/17 0947)  RLE Motor Response: Purposeful (01/31/17 0947)   At baseline    Mental Status and Level of Consciousness: Arousable    Pulmonary Status:   O2 Device: Room air (01/31/17 1025)   Adequate oxygenation and airway patent    Complications related to anesthesia: None    Post-anesthesia assessment completed.  No concerns    Signed By: Gricel Mills MD     January 31, 2017

## 2017-01-31 NOTE — H&P (VIEW-ONLY)
HISTORY OF PRESENT ILLNESS  Ruddy Chavarria is a 78 y.o. male.   HPI Comments:   Treated for Kidney stone by Dr. Giovanna Oneal in March    Hx of R IHR    Left groin  Getting bigger    No nausea  Appetite ok  EGD last week with Dr. Hernandez Cancer +Vincent's, symptoms controlled with PPI          ____________________________________________________________________________  Patient presents with:  Possible Hernia: Seen at the request of Dr. Giovanna Oneal for evaluation of possible hernia     Resp 20  Ht 5' 7\" (1.702 m)  Wt 77.6 kg (171 lb)  BMI 26.78 kg/m2  Past Medical History:    Acid indigestion                                              Calculus of kidney                                            GERD (gastroesophageal reflux disease)                        History of Vincent's esophagus                  1/10/2017       Comment:In the past, none 2014    History of kidney stones                        2012            Comment:has surgery 12/2012 and 3/2016    [place] (hard of hearing)                                           Comment:bilateral hearing aides    Osteopenia                                                      Comment:Dr Melia Taylor gives him Prolia q6 months    Seizures ([address]                                                  Comment:after syncopal episodes; as of 1/9/17 pt states               this was \"4-5 years ago\"    Syncope, vasovagal                                              Comment:as of 1/9/17 pt states this was \"4-5 years ago\"  Past Surgical History:    HX APPENDECTOMY                                                HX KYPHOPLASTY                                   2011            Comment:[id number]    [address]            Comment:right inguinal    HX LITHOTRIPSY                                   12/2012, *    HX RETINAL DETACHMENT REPAIR                    Right 1981          HX CATARACT REMOVAL                             Bilateral 2015          SD NEUROLOGICAL PROCEDURE UNLISTED Right 06/03/2013      Comment:L4-5 Microdiscectomy  Social History    Marital status:              Spouse name:                       Years of education:                 Number of children:               Social History Main Topics    Smoking status: Never Smoker                                                                Smokeless status: Never Used                        Alcohol use: Yes           4.2 oz/week       7 Cans of beer per week       Comment: as of 1/9/17 pt reports 1 beer daily    Drug use: No              Review of patient's family history indicates:    Heart Disease                  Father                    Malignant Hyperthermia         Neg Hx                    Pseudocholinesterase Deficien* Neg Hx                    Delayed Awakening              Neg Hx                    Post-op Nausea/Vomiting        Neg Hx                    Emergence Delirium             Neg Hx                    Post-op Cognitive Dysfunction  Neg Hx                    Current Outpatient Prescriptions:  omeprazole (PRILOSEC) 20 mg capsule, Take 20 mg by mouth every morning. aspirin delayed-release 81 mg tablet, Take  by mouth every morning.  multivitamin (ONE A DAY) tablet, Take 1 Tab by mouth daily. calcium citrate-vitamin D3 (CITRACAL + D) tablet, Take 1 Tab by mouth daily. cholecalciferol (VITAMIN D3) 400 unit tab tablet, Take  by mouth daily. red yeast rice extract 600 mg cap, Take 600 mg by mouth nightly. denosumab (PROLIA) 60 mg/mL injection, 60 mg by SubCUTAneous route. q6 months - 12/2016  ranitidine (ZANTAC) 150 mg tablet, Take 150 mg by mouth nightly. PRASTERONE, DHEA, (DHEA PO), Take 50 mg by mouth daily. No current facility-administered medications for this visit. Allergies: No Known Allergies  _____________________________________________________________________________      Possible Hernia   The history is provided by the patient. This is a chronic problem.  The current episode started more than 1 week ago. The problem occurs constantly. The problem has been gradually worsening. Pertinent negatives include no chest pain, no abdominal pain, no headaches and no shortness of breath. The symptoms are aggravated by exertion. The symptoms are relieved by rest. The treatment provided no relief. Review of Systems   Constitutional: Negative for chills, fever and weight loss. HENT: Negative for ear pain. Eyes: Negative for pain. Respiratory: Negative for shortness of breath. Cardiovascular: Negative for chest pain. Gastrointestinal: Negative for abdominal pain and blood in stool. Genitourinary: Negative for hematuria. Musculoskeletal: Negative for joint pain. Skin: Negative for rash. Neurological: Negative for dizziness, focal weakness, seizures and headaches. Endo/Heme/Allergies: Does not bruise/bleed easily. Psychiatric/Behavioral: The patient does not have insomnia. Physical Exam   Constitutional: He is oriented to person, place, and time. He appears well-developed and well-nourished. No distress. HENT:   Head: Normocephalic. Mouth/Throat: No oropharyngeal exudate. Eyes: Pupils are equal, round, and reactive to light. No scleral icterus. Neck: Neck supple. No tracheal deviation present. Cardiovascular: Normal rate, regular rhythm and normal heart sounds. Pulmonary/Chest: Effort normal and breath sounds normal. He has no wheezes. Abdominal: Soft. He exhibits no distension and no mass. There is no hepatosplenomegaly. There is no tenderness. There is no CVA tenderness. A hernia is present. Hernia confirmed positive in the left inguinal area. Hernia confirmed negative in the right inguinal area. Musculoskeletal: Normal range of motion. He exhibits no edema. Lymphadenopathy:     He has no cervical adenopathy. Neurological: He is alert and oriented to person, place, and time. Skin: Skin is warm. No rash noted. No erythema.    Psychiatric: He has a normal mood and affect. His behavior is normal.       ASSESSMENT and PLAN    ICD-10-CM ICD-9-CM    1. Left inguinal hernia K40.90 550.90        Lulu Gael is having symptoms. I have recommended to him that we proceed with surgery. I had an extensive discussion with him regarding the risks, benefits, and alternatives of proceeding with a Laparoscopic Left, Possible Bilateral Inguinal Hernia Repair with Mesh, Robot Assisted. Risks,benefits, and alternatives were discussed including the risk of anesthesia, bleeding, infection, including mesh infection, chronic orchialgia, neuralgia, other pain syndromes, testicular ischemia, conversion to open, injury to bowel, and recurrence were discussed. I reviewed with Lulu Gale his increased risk of bleeding secondary to Asprin use. I have asked him to discontinue the Asprin for 2 days prior to surgery to reduce this risk. He is in agreement to proceed. All questions were answered. Lulu Gale will undergo preoperative evaluation and will proceed provided he is medically stable. We will schedule him at his earliest convenience. Thank you for this consult.

## 2017-01-31 NOTE — DISCHARGE INSTRUCTIONS
Dr. Rawls Courts Discharge Instructions for:  Bren Meraz    MRN: [id number] : 1950    Admitted: 2017  Discharged: 2017     ** ok to restart aspirin on 2017 **    What to do at [address]: Resume your usual diet    Recommended activity: Lift less than 10 lbs for 4 weeks. Follow-up with Dr. Maday Barajas in 2-3 weeks. Call [phone] for an appointment. Inguinal Hernia Repair: What to Expect at [address] are likely to have pain for the next few days. You may also feel like you have the flu, and you may have a low fever and feel tired and nauseated. This is common. You should feel better after a few days and will probably feel much better in 7 days. For several weeks you may feel twinges or pulling in the hernia repair when you move. You may have some bruising on the scrotum and along the penis. This is normal.    Men will need to wear a jockstrap or briefs, not boxers, for scrotal support for several days after a groin (inguinal) hernia repair. Spandex bicycle shorts may provide good support. This care sheet gives you a general idea about how long it will take for you to recover. But each person recovers at a different pace. Follow the steps below to get better as quickly as possible. How can you care for yourself at home? Activity  Rest when you feel tired. Getting enough sleep will help you recover. Sleep with your head up by using three or four pillows. You can also try to sleep with your head up in a recliner chair. Try to walk each day. Start by walking a little more than you did the day before. Bit by bit, increase the amount you walk. Walking boosts blood flow and helps prevent pneumonia and constipation. Put ice or a cold pack on the area of your hernia repair for 10 to 15 minutes at a time. Try to do this every 1 to 2 hours for the first 24 hours (when you are awake) or until the swelling goes down.  Put a thin cloth between the ice and your skin. Avoid strenuous activities, such as biking, jogging, weight lifting, or aerobic exercise, until your doctor says it is okay. Avoid lifting anything that would make you strain. This may include heavy grocery bags and milk containers, a heavy briefcase or backpack, cat litter or dog food bags, a child, or a vacuum . You may drive when you are no longer taking pain medicine and can quickly move your foot from the gas pedal to the brake. You must also be able to sit comfortably for a long period of time, even if you do not plan to go far. You might get caught in traffic. Most people are able to return to work within 1 to 2 weeks after surgery. You may shower. Pat the cut (incision) dry. Do not take a bath for 2 weeks. You can have sex again when it feels comfortable to do so. Diet  You can eat your normal diet. If your stomach is upset, try bland, low-fat foods like plain rice, broiled chicken, toast, and yogurt. Drink plenty of fluids (unless your doctor tells you not to). You may notice that your bowel movements are not regular right after your surgery. This is common. Avoid constipation and straining with bowel movements. You may want to take a fiber supplement every day. If you have not had a bowel movement after a couple of days, ask your doctor about taking a mild laxative. Medicines  Take pain medicines exactly as directed. If the doctor gave you a prescription medicine for pain, take it as prescribed. If you are not taking a prescription pain medicine, take an over-the-counter medicine such as acetaminophen (Tylenol), ibuprofen (Advil, Motrin), or naproxen (Aleve). Read and follow all instructions on the label. Do not take two or more pain medicines at the same time unless the doctor told you to. Many pain medicines have acetaminophen, which is Tylenol. Too much acetaminophen (Tylenol) can be harmful. If your doctor prescribed antibiotics, take them as directed.  Do not stop taking them just because you feel better. You need to take the full course of antibiotics. If you think your pain medicine is making you sick to your stomach: Take your medicine after meals (unless your doctor has told you not to). Ask your doctor for a different pain medicine. Incision care  If you have strips of tape on the cut (incision) the doctor made, leave the tape on for a week or until it falls off. If you have staples closing the cut, you will need to visit your doctor in 1 to 2 weeks to have them removed. Wash the area daily with warm, soapy water and pat it dry. Follow-up care is a key part of your treatment and safety. Be sure to make and go to all appointments, and call your doctor if you are having problems. Its also a good idea to know your test results and keep a list of the medicines you take. When should you call for help? Call 911 anytime you think you may need emergency care. For example, call if:  You pass out (lose consciousness). You have sudden chest pain and shortness of breath, or you cough up blood. You have severe pain in your belly. Call your doctor now or seek immediate medical care if:  You are sick to your stomach and cannot keep fluids down. You have signs of a blood clot, such as:  Pain in your calf, back of the knee, thigh, or groin. Redness and swelling in your leg or groin. You have trouble passing urine or stool, especially if you have mild pain or swelling in your lower belly. You have hot flashes, sweating, flushing, or a fast or pounding heartbeat. Bright red blood has soaked through the bandage over your incision. Watch closely for any changes in your health, and be sure to contact your doctor if:  Your swelling is getting worse. Your swelling is not going down.                 How to Care for Your Wound After Its Treated With  DERMABOND* Topical Skin Adhesive  DERMABOND* Topical Skin Adhesive (2-octyl cyanoacrylate) is a sterile, liquid skin adhesive  that holds wound edges together. The film will usually remain in place for 5 to 10 days, then  naturally fall off your skin. The following will answer some of your questions and provide instructions for proper care for your  wound while it is healing:    CHECK WOUND APPEARANCE   Some swelling, redness, and pain are common with all wounds and normally will go away as the  wound heals. If swelling, redness, or pain increases or if the wound feels warm to the touch,  contact a doctor. Also contact a doctor if the wound edges reopen or separate. REPLACE BANDAGES   If your wound is bandaged, keep the bandage dry.  Replace the dressing daily until the adhesive film has fallen off or if the  bandage should become wet, unless otherwise instructed by your  physician.  When changing the dressing, do not place tape directly over the  DERMABOND adhesive film, because removing the tape later may also  remove the film. AVOID TOPICAL MEDICATIONS   Do not apply liquid or ointment medications or any other product to your wound while the  DERMABOND adhesive film is in place. These may loosen the film before your wound is healed. KEEP WOUND DRY AND PROTECTED   You may occasionally and briefly wet your wound in the shower or bath. Do not soak or scrub  your wound, do not swim, and avoid periods of heavy perspiration until the DERMABOND  adhesive has naturally fallen off. After showering or bathing, gently blot your wound dry with a  soft towel. If a protective dressing is being used, apply a fresh, dry bandage, being sure to keep  the tape off the DERMABOND adhesive film.  Apply a clean, dry bandage over the wound if necessary to protect it.  Protect your wound from injury until the skin has had sufficient time to heal.   Do not scratch, rub, or pick at the DERMABOND adhesive film. This may loosen the film before  your wound is healed.    Protect the wound from prolonged exposure to sunlight or tanning lamps while the film is in  place. If you have any questions or concerns about this product, please consult your doctor. *Trademark ©[employer] 2002      A common side effect of anesthesia following surgery is nausea and/or vomiting. In order to decrease symptoms, it is wise to avoid foods that are high in fat, greasy foods, milk products, and spicy foods for the first 24 hours. Acceptable foods for the first 24 hours following surgery include but are not limited to:     soup   broth    toast    crackers    applesauce    bananas    mashed potatoes,   soft or scrambled eggs   oatmeal    jello    It is important to eat when taking your pain medication. This will help to prevent nausea. If possible, please try to time your meals with your medications. It is very important to stay hydrated following surgery. Sip fluids frequently while awake. Avoid acidic drinks such as citrus juices and soda for 24 hours. Carbonated beverages may cause bloating and gas. Acceptable fluids include:    - water (flavor packets may add variety)  - coffee or tea (in moderation)  - Gatorade  - Pavan-aid  - apple juice  - cranberry juice    You are encouraged to cough and deep breathe every hour when awake. This will help to prevent respiratory complications following anesthesia. You may want to hug a pillow when coughing and sneezing to add additional support to the surgical area and to decrease discomfort if you had abdominal or chest surgery. If you are discharged home with support stockings, you may remove them after 24 hours. Support stockings are used to help prevent blood clots in the legs following surgery. Please take time to review all of your Home Care Instructions and Medication Information sheets provided in your discharge packet. If you have any questions, please contact your surgeons office. Thank you.         DISCHARGE SUMMARY from Nurse    The following personal items are in your possession at time of discharge:    Dental Appliances: None        Home Medications: None  Jewelry: None  Clothing: Footwear, Shirt, Pants, Socks, Undergarments  Other Valuables: Eyeglasses             PATIENT INSTRUCTIONS:    After general anesthesia or intravenous sedation, for 24 hours or while taking prescription Narcotics:  · Limit your activities  · Do not drive and operate hazardous machinery  · Do not make important personal or business decisions  · Do  not drink alcoholic beverages  · If you have not urinated within 8 hours after discharge, please contact your surgeon on call. Report the following to your surgeon:  · Excessive pain, swelling, redness or odor of or around the surgical area  · Temperature over 100.5  · Nausea and vomiting lasting longer than 4 hours or if unable to take medications  · Any signs of decreased circulation or nerve impairment to extremity: change in color, persistent  numbness, tingling, coldness or increase pain  · Any questions        *  Please give a list of your current medications to your Primary Care Provider. *  Please update this list whenever your medications are discontinued, doses are      changed, or new medications (including over-the-counter products) are added. *  Please carry medication information at all times in case of emergency situations. These are general instructions for a healthy lifestyle:    No smoking/ No tobacco products/ Avoid exposure to second hand smoke    Surgeon General's Warning:  Quitting smoking now greatly reduces serious risk to your health.     Obesity, smoking, and sedentary lifestyle greatly increases your risk for illness    A healthy diet, regular physical exercise & weight monitoring are important for maintaining a healthy lifestyle    You may be retaining fluid if you have a history of heart failure or if you experience any of the following symptoms:  Weight gain of 3 pounds or more overnight or 5 pounds in a week, increased swelling in our hands or feet or shortness of breath while lying flat in bed. Please call your doctor as soon as you notice any of these symptoms; do not wait until your next office visit. Recognize signs and symptoms of STROKE:    F-face looks uneven    A-arms unable to move or move unevenly    S-speech slurred or non-existent    T-time-call 911 as soon as signs and symptoms begin-DO NOT go       Back to bed or wait to see if you get better-TIME IS BRAIN. Warning Signs of HEART ATTACK     Call 911 if you have these symptoms:   Chest discomfort. Most heart attacks involve discomfort in the center of the chest that lasts more than a few minutes, or that goes away and comes back. It can feel like uncomfortable pressure, squeezing, fullness, or pain.  Discomfort in other areas of the upper body. Symptoms can include pain or discomfort in one or both arms, the back, neck, jaw, or stomach.  Shortness of breath with or without chest discomfort.  Other signs may include breaking out in a cold sweat, nausea, or lightheadedness. Don't wait more than five minutes to call 911 - MINUTES MATTER! Fast action can save your life. Calling 911 is almost always the fastest way to get lifesaving treatment. Emergency Medical Services staff can begin treatment when they arrive -- up to an hour sooner than if someone gets to the hospital by car. The discharge information has been reviewed with the {PATIENT/Caregiver). The {PATIENT/Caregiver) verbalized understanding. Discharge medications reviewed with the ([place] meds reviewed [id number]) and appropriate educational materials and side effects teaching were provided. Oxycodone/Acetaminophen (By mouth)   Acetaminophen (g-vmfw-v-MIN-oh-fen), Oxycodone Hydrochloride (dw-o-QED-done jo-droe-KLOR-floyd)  Treats moderate to moderately severe pain. This medicine is a narcotic pain reliever.    Brand Name(s):Endocet, Percocet, Primlev, Roxicet, Xartemis XR   There may be other brand names for this medicine. When This Medicine Should Not Be Used: This medicine is not right for everyone. Do not use it if you had an allergic reaction to acetaminophen or oxycodone, or if you have serious breathing problems (such as severe asthma, hypercarbia, respiratory depression) or paralytic ileus. How to Use This Medicine:   Capsule, Liquid, Tablet, Long Acting Tablet  · Your doctor will tell you how much medicine to use. Do not use more than directed. · Measure the oral liquid medicine with a marked measuring spoon, oral syringe, or medicine cup. · Swallow the extended-release tablet whole. Do not crush, break, or chew it. Do not lick or wet the tablet before placing it in your mouth. Do not give this medicine through a feeding tube. · This medicine should come with a Medication Guide. Ask your pharmacist for a copy if you do not have one. · Store the medicine in a closed container at room temperature, away from heat, moisture, and direct light. Ask your pharmacist about the best way to dispose of medicine you do not use. Drugs and Foods to Avoid:   Ask your doctor or pharmacist before using any other medicine, including over-the-counter medicines, vitamins, and herbal products. · Do not use Xartemis XR if you have used an MAO inhibitor in the past 14 days. · Some medicines and foods can affect how this medicine works. Tell your doctor if you are taking any of the following:   ¨ Butorphanol, lamotrigine, nalbuphine, naltrexone, pentazocine, propranolol, zidovudine  ¨ Birth control pills, a diuretic (water pill), muscle relaxants, a phenothiazine medicine (chlorpromazine, perphenazine, promethazine, prochlorperazine, thioridazine)  · Do not drink alcohol while you are using this medicine. Acetaminophen can damage your liver, and alcohol can increase this risk. Do not take acetaminophen without asking your doctor if you have 3 or more drinks of alcohol every day.   · Tell your doctor if you are using any medicine that makes you sleepy, such as allergy medicine or other narcotic pain medicine. Warnings While Using This Medicine:   · Tell your doctor if you are pregnant, or if you have kidney disease, liver disease, heart disease, low blood pressure, breathing problems or lung disease, especially if you have asthma, COPD, cor pulmonale, or kyphoscoliosis (curved spine that causes breathing trouble). Tell your doctor if you have urination problems, an underactive thyroid, [place] disease, pancreas or prostate problems, or a stomach disorder. Tell your doctor if you have a history of head injury or brain damage, seizures, or alcohol or drug abuse. Tell your doctor if you are allergic to codeine. · Do not breastfeed while you are using this medicine, unless otherwise directed by your doctor. · This medicine may cause the following problems:  ¨ Liver problems  ¨ Serious skin reactions  ¨ Low blood pressure  · If you take this medicine for more than a few weeks, do not stop taking it suddenly. Your doctor will need to slowly decrease your dose before you stop it completely. · Get emergency help immediately if you think you may have taken too much of this medicine. Signs of an overdose include shallow breathing, fainting, confusion, nausea, vomiting, pinpoint pupils of the eyes, or pale or blue lips, fingernails, or skin. · This medicine may make you dizzy or drowsy. Do not drive or do anything that could be dangerous until you know how this medicine affects you. · This medicine contains acetaminophen. Read the labels of all other medicines you are using to see if they also contain acetaminophen, or ask your doctor or pharmacist. Kodak Cook not use more than 4 grams (4,000 milligrams) total of acetaminophen in one day. · This medicine can be habit-forming. Do not use more than your prescribed dose. Call your doctor if you think your medicine is not working.   · This medicine may cause constipation, especially with long-term use. Ask your doctor if you should use a laxative to prevent and treat constipation. · Keep all medicine out of the reach of children. Never share your medicine with anyone. Possible Side Effects While Using This Medicine:   Call your doctor right away if you notice any of these side effects:  · Allergic reaction: Itching or hives, swelling in your face or hands, swelling or tingling in your mouth or throat, chest tightness, trouble breathing  · Dark urine or pale stools, nausea, vomiting, loss of appetite, stomach pain, yellow skin or eyes  · Extreme weakness, shallow breathing, uneven heartbeat, seizures, sweating, or cold or clammy skin  · Lightheadedness, dizziness, or fainting  · Trouble breathing  If you notice these less serious side effects, talk with your doctor:   · Constipation  · Headache  · Mild lightheadedness, sleepiness, or drowsiness  · Mild nausea or vomiting  If you notice other side effects that you think are caused by this medicine, tell your doctor. Call your doctor for medical advice about side effects. You may report side effects to FDA at 6-778-FDA-4613  © 2016 [address] is for End User's use only and may not be sold, redistributed or otherwise used for commercial purposes. The above information is an  only. It is not intended as medical advice for individual conditions or treatments. Talk to your doctor, nurse or pharmacist before following any medical regimen to see if it is safe and effective for you.

## 2017-01-31 NOTE — INTERVAL H&P NOTE
H&P Update:  Staci Urbina was seen and examined. History and physical has been reviewed. The patient has been examined. There have been no significant clinical changes since the completion of the originally dated History and Physical. Site marked.

## 2017-01-31 NOTE — PERIOP NOTES
Patient unable to void after multiple attempts. Ambulated around surgical area and still not available to void. Bladder scan shows 474 ml. Notified Dr Colonel Feldman and after discussion with him & patient, patient decides to have senior inserted and go home with leg bag and will follow up with urology. Dr Colonel Feldman states that his office will get in touch with urology tomorrow and set up follow up.

## 2017-01-31 NOTE — PERIOP NOTES
Discharge instructions reviewed with patient & his wife. Teaching completed on senior/leg bag care at home. Wife (who hand wrote own notes of instructions) & patient both demonstrated emptying of bag and changing from leg bag to large senior bag and in reverse. Senior remains in place per physician order. Patient given 1 pain pill prior to discharge home, patient tolerated applesauce and water per request. Written discharge instructions reviewed again, which had previously been reviewed and signed by DOT Luis RN. Opportunity for questions and answers given, able to indicate and demonstrate understanding of teaching. Escorted out for discharge home via wheelchair by nurse.

## 2017-02-01 ENCOUNTER — TELEPHONE (OUTPATIENT)
Dept: SURGERY | Age: 67
End: 2017-02-01

## 2017-02-01 NOTE — TELEPHONE ENCOUNTER
----- Message from Samuel Cunningham MD sent at 1/31/2017  5:12 PM EST -----  Can you set him up with an appointment with [facility]. We had to send him home with a leg bag today.

## 2017-02-01 NOTE — TELEPHONE ENCOUNTER
Pt agreeable with appointment with Dr. Anitha Wasserman @ [facility] on 2/6 @ 8:20am [address] Costello intact draining clear urine. Doing well post hernia surgery on 1/31. Informed pt if there are any further concerns call the office.

## 2017-02-16 ENCOUNTER — OFFICE VISIT (OUTPATIENT)
Dept: SURGERY | Age: 67
End: 2017-02-16

## 2017-02-16 VITALS
HEART RATE: 62 BPM | HEIGHT: 67 IN | DIASTOLIC BLOOD PRESSURE: 74 MMHG | WEIGHT: 173 LBS | RESPIRATION RATE: 20 BRPM | BODY MASS INDEX: 27.15 KG/M2 | SYSTOLIC BLOOD PRESSURE: 112 MMHG | OXYGEN SATURATION: 98 %

## 2017-02-16 DIAGNOSIS — Z09 POSTOPERATIVE EXAMINATION: Primary | ICD-10-CM

## 2018-12-20 ENCOUNTER — HOSPITAL ENCOUNTER (OUTPATIENT)
Dept: BONE DENSITY | Age: 68
Discharge: HOME | End: 2018-12-20
Attending: SPECIALIST
Payer: MEDICARE

## 2018-12-20 DIAGNOSIS — Z13.820 ENCOUNTER FOR SCREENING FOR OSTEOPOROSIS: ICD-10-CM

## 2018-12-20 DIAGNOSIS — M89.9 DISORDER OF BONE AND CARTILAGE: ICD-10-CM

## 2018-12-20 DIAGNOSIS — M94.9 DISORDER OF BONE AND CARTILAGE: ICD-10-CM

## 2018-12-20 PROCEDURE — 77080 DXA BONE DENSITY AXIAL: CPT

## 2019-05-21 ENCOUNTER — HOSPITAL ENCOUNTER (OUTPATIENT)
Dept: INFUSION THERAPY | Age: 69
Discharge: HOME | End: 2019-05-21
Payer: MEDICARE

## 2019-05-21 VITALS
DIASTOLIC BLOOD PRESSURE: 66 MMHG | HEART RATE: 72 BPM | SYSTOLIC BLOOD PRESSURE: 110 MMHG | OXYGEN SATURATION: 96 % | RESPIRATION RATE: 16 BRPM | TEMPERATURE: 98 F

## 2019-05-21 PROCEDURE — 74011250636 HC RX REV CODE- 250/636: Performed by: FAMILY MEDICINE

## 2019-05-21 PROCEDURE — 96372 THER/PROPH/DIAG INJ SC/IM: CPT

## 2019-05-21 RX ADMIN — DENOSUMAB 60 MG: 60 INJECTION SUBCUTANEOUS at 11:12

## 2019-05-21 NOTE — PROGRESS NOTES
[facility] Short Visit Progress Note    1100  Pt admit to [facility] for Prolia ambulatory in stable condition. Assessment completed. No new concerns voiced. Patient denies recent dental work, but states he will have an upcoming appt for a cleaning scheduled. Patient reports recently having labs drawn at [facility] (04.26.2019). PCT called to have labs faxed over for review and to administering medication. Labs reviewed by Sheldon Pitts RN &  Mary Blunt RN prior to administering medication. Calcium level of 9.6 noted. Will scan to chart, please review. Patient Vitals for the past 12 hrs:   Temp Pulse Resp BP SpO2   05/21/19 1102 98 °F (36.7 °C) 72 16 110/66 96 %       Medications:  Prolia    1115 Pt tolerated treatment well. D/c home ambulatory in no distress. Pt aware of next appointment scheduled for 11.19.2019.

## 2019-11-19 ENCOUNTER — HOSPITAL ENCOUNTER (OUTPATIENT)
Dept: INFUSION THERAPY | Age: 69
Discharge: HOME | End: 2019-11-19
Payer: MEDICARE

## 2019-11-19 VITALS
WEIGHT: 171.3 LBS | DIASTOLIC BLOOD PRESSURE: 77 MMHG | TEMPERATURE: 97.7 F | HEART RATE: 62 BPM | OXYGEN SATURATION: 99 % | RESPIRATION RATE: 16 BRPM | BODY MASS INDEX: 26.83 KG/M2 | SYSTOLIC BLOOD PRESSURE: 117 MMHG

## 2019-11-19 PROCEDURE — 74011250636 HC RX REV CODE- 250/636: Performed by: FAMILY MEDICINE

## 2019-11-19 PROCEDURE — 96372 THER/PROPH/DIAG INJ SC/IM: CPT

## 2019-11-19 RX ADMIN — DENOSUMAB 60 MG: 60 INJECTION SUBCUTANEOUS at 11:09

## 2019-11-19 NOTE — PROGRESS NOTES
[facility] Visit Note:  Arrived - Randa 450 obtained 11/8/19, Ca 9.5    Visit Vitals  /77 (BP 1 Location: Right arm, BP Patient Position: Sitting)   Pulse 62   Temp 97.7 °F (36.5 °C)   Resp 16   Wt 77.7 kg (171 lb 4.8 oz)   SpO2 99%   BMI 26.83 kg/m²     Assessment - unchanged. Reviewed Prolia info. Pt denies any recent or upcoming dental work. Prolia 60mg SQ slowly in R arm. 1110 - Tolerated well. No reaction noted. Reviewed Prolia D/C instructions. Verbalized understanding. Pt denies any acute problems/changes. Discharged from [facility] ambulatory. No distress.  Next appt: 5/22/20 at 1100

## 2019-12-04 RX ORDER — LOVASTATIN 20 MG/1
20 TABLET ORAL
COMMUNITY

## 2019-12-05 ENCOUNTER — ANESTHESIA EVENT (OUTPATIENT)
Dept: ENDOSCOPY | Age: 69
End: 2019-12-05
Payer: MEDICARE

## 2019-12-05 ENCOUNTER — HOSPITAL ENCOUNTER (OUTPATIENT)
Age: 69
Setting detail: OUTPATIENT SURGERY
Discharge: HOME | End: 2019-12-05
Attending: SPECIALIST | Admitting: SPECIALIST
Payer: MEDICARE

## 2019-12-05 ENCOUNTER — ANESTHESIA (OUTPATIENT)
Dept: ENDOSCOPY | Age: 69
End: 2019-12-05
Payer: MEDICARE

## 2019-12-05 VITALS
HEART RATE: 70 BPM | SYSTOLIC BLOOD PRESSURE: 106 MMHG | WEIGHT: 166.31 LBS | BODY MASS INDEX: 26.1 KG/M2 | TEMPERATURE: 97.7 F | OXYGEN SATURATION: 96 % | HEIGHT: 67 IN | RESPIRATION RATE: 13 BRPM | DIASTOLIC BLOOD PRESSURE: 65 MMHG

## 2019-12-05 PROBLEM — Z12.11 SCREEN FOR COLON CANCER: Status: ACTIVE | Noted: 2019-12-05

## 2019-12-05 PROCEDURE — 74011250636 HC RX REV CODE- 250/636: Performed by: SPECIALIST

## 2019-12-05 PROCEDURE — 76060000031 HC ANESTHESIA FIRST 0.5 HR: Performed by: SPECIALIST

## 2019-12-05 PROCEDURE — 74011250636 HC RX REV CODE- 250/636: Performed by: ANESTHESIOLOGY

## 2019-12-05 PROCEDURE — 76040000019: Performed by: SPECIALIST

## 2019-12-05 PROCEDURE — 74011000250 HC RX REV CODE- 250: Performed by: ANESTHESIOLOGY

## 2019-12-05 PROCEDURE — 74011250637 HC RX REV CODE- 250/637: Performed by: SPECIALIST

## 2019-12-05 RX ORDER — NALOXONE HYDROCHLORIDE 0.4 MG/ML
0.4 INJECTION, SOLUTION INTRAMUSCULAR; INTRAVENOUS; SUBCUTANEOUS
Status: DISCONTINUED | OUTPATIENT
Start: 2019-12-05 | End: 2019-12-05 | Stop reason: HOSPADM

## 2019-12-05 RX ORDER — EPINEPHRINE 0.1 MG/ML
1 INJECTION INTRACARDIAC; INTRAVENOUS
Status: DISCONTINUED | OUTPATIENT
Start: 2019-12-05 | End: 2019-12-05 | Stop reason: HOSPADM

## 2019-12-05 RX ORDER — SODIUM CHLORIDE 0.9 % (FLUSH) 0.9 %
5-40 SYRINGE (ML) INJECTION EVERY 8 HOURS
Status: DISCONTINUED | OUTPATIENT
Start: 2019-12-05 | End: 2019-12-05 | Stop reason: HOSPADM

## 2019-12-05 RX ORDER — DEXTROMETHORPHAN/PSEUDOEPHED 2.5-7.5/.8
1.2 DROPS ORAL
Status: DISCONTINUED | OUTPATIENT
Start: 2019-12-05 | End: 2019-12-05 | Stop reason: HOSPADM

## 2019-12-05 RX ORDER — ATROPINE SULFATE 0.1 MG/ML
0.5 INJECTION INTRAVENOUS
Status: DISCONTINUED | OUTPATIENT
Start: 2019-12-05 | End: 2019-12-05 | Stop reason: HOSPADM

## 2019-12-05 RX ORDER — PROPOFOL 10 MG/ML
INJECTION, EMULSION INTRAVENOUS AS NEEDED
Status: DISCONTINUED | OUTPATIENT
Start: 2019-12-05 | End: 2019-12-05 | Stop reason: HOSPADM

## 2019-12-05 RX ORDER — FLUMAZENIL 0.1 MG/ML
0.2 INJECTION INTRAVENOUS
Status: DISCONTINUED | OUTPATIENT
Start: 2019-12-05 | End: 2019-12-05 | Stop reason: HOSPADM

## 2019-12-05 RX ORDER — MIDAZOLAM HYDROCHLORIDE 1 MG/ML
.25-5 INJECTION, SOLUTION INTRAMUSCULAR; INTRAVENOUS
Status: DISCONTINUED | OUTPATIENT
Start: 2019-12-05 | End: 2019-12-05 | Stop reason: HOSPADM

## 2019-12-05 RX ORDER — SODIUM CHLORIDE 9 MG/ML
75 INJECTION, SOLUTION INTRAVENOUS CONTINUOUS
Status: DISCONTINUED | OUTPATIENT
Start: 2019-12-05 | End: 2019-12-05 | Stop reason: HOSPADM

## 2019-12-05 RX ORDER — SODIUM CHLORIDE 0.9 % (FLUSH) 0.9 %
5-40 SYRINGE (ML) INJECTION AS NEEDED
Status: DISCONTINUED | OUTPATIENT
Start: 2019-12-05 | End: 2019-12-05 | Stop reason: HOSPADM

## 2019-12-05 RX ORDER — LIDOCAINE HYDROCHLORIDE 20 MG/ML
INJECTION, SOLUTION EPIDURAL; INFILTRATION; INTRACAUDAL; PERINEURAL AS NEEDED
Status: DISCONTINUED | OUTPATIENT
Start: 2019-12-05 | End: 2019-12-05 | Stop reason: HOSPADM

## 2019-12-05 RX ADMIN — SIMETHICONE 80 MG: 20 SUSPENSION/ DROPS ORAL at 07:56

## 2019-12-05 RX ADMIN — PROPOFOL 200 MG: 10 INJECTION, EMULSION INTRAVENOUS at 08:04

## 2019-12-05 RX ADMIN — SODIUM CHLORIDE 75 ML/HR: 900 INJECTION, SOLUTION INTRAVENOUS at 07:30

## 2019-12-05 RX ADMIN — LIDOCAINE HYDROCHLORIDE 40 MG: 20 INJECTION, SOLUTION EPIDURAL; INFILTRATION; INTRACAUDAL; PERINEURAL at 07:42

## 2019-12-05 NOTE — ANESTHESIA PREPROCEDURE EVALUATION
Anesthetic History   No history of anesthetic complications            Review of Systems / Medical History  Patient summary reviewed, nursing notes reviewed and pertinent labs reviewed    Pulmonary  Within defined limits                 Neuro/Psych     seizures (associated with vasovagal episodes only)         Cardiovascular              Hyperlipidemia    Exercise tolerance: >4 METS  Comments: Denies hrt probs or chest pain   GI/Hepatic/Renal     GERD: well controlled    Renal disease: stones       Endo/Other  Within defined limits           Other Findings   Comments: [place]         Physical Exam    Airway  Mallampati: I  TM Distance: 4 - 6 cm  Neck ROM: normal range of motion   Mouth opening: Normal     Cardiovascular    Rhythm: regular  Rate: normal      Pertinent negatives: No murmur   Dental  No notable dental hx       Pulmonary  Breath sounds clear to auscultation               Abdominal  GI exam deferred       Other Findings            Anesthetic Plan    ASA: 2  Anesthesia type: total IV anesthesia          Induction: Intravenous  Anesthetic plan and risks discussed with: Patient

## 2019-12-05 NOTE — ROUTINE PROCESS
Emma Laboy  1950  [id number]    Situation:  Verbal report received from: Vanesa BROWN  Procedure: Procedure(s):  COLONOSCOPY    Background:    Preoperative diagnosis: SCREENING COLONOSCOPY (SCREENING FOR COLONIC NEOPLASIA)  Postoperative diagnosis: Negative exam    :  Dr. Mary Sanders  Assistant(s): Endoscopy Technician-1: Kei Sánchez  Endoscopy RN-1: Jarold Sever, RN    Specimens: * No specimens in log *  H. Pylori  no    Assessment:  Intra-procedure medications       Anesthesia gave intra-procedure sedation and medications, see anesthesia flow sheet yes    Intravenous fluids: NS@ KVO     Vital signs stable       Abdominal assessment: round and soft       Recommendation:  Discharge patient per MD order.     Family or Friend  Wife Chastity Bradley to share finding with family or friend yes

## 2019-12-05 NOTE — PERIOP NOTES
Anesthesia reports 200mg Propofol, 40mg Lidocaine and 350mL NS given during procedure. Received report from anesthesia staff on vital signs and status of patient.

## 2019-12-05 NOTE — PROCEDURES
Colonoscopy    Indications: screen colon cancer    Pre-operative Diagnosis: see above    Assistant(s):  see chart Vanesa BROWN  Cliff Potts tech    Medications:  See anesthesia form    Post-operative Diagnosis:  Negative exam            Procedure Details   Prior to the procedure its objectives, risks, consequences and alternatives were discussed with the patient who then elected to proceed. All questions were answered. Digital Rectal Exam:  was normal     The Olympus videocolonoscope was inserted in the rectum and advanced to the cecum. The cecum was identified by typical landmarks. The colonoscope was slowly and carefully withdrawn as the mucosa was inspected. No abnormalities were noted. Retroflexion in the rectum was negative. Photos to document the ileocecal valve, appendiceal orifice and retroflexion exam were obtained. The preparation was adequate      Estimated Blood Loss:  none    Specimens:  none    Findings:  Negative exam    Complications:  none    Implants:  none    Repeat colonoscopy is recommended in:  Ten years (if in excellent health)  .                Fermin Rutherford MD  8:07 AM  12/5/2019

## 2019-12-05 NOTE — PROGRESS NOTES
I spoke with Dr. Arti Tinoco regarding patients history and lower the  BP's in recovery- orthostatic vital signs earlier. Patient is stable, BP/heart rate  is stable. Patient has remained asymptomatic  Throughout recovery period. Okay with Dr. Arti Tinoco for discharge.

## 2019-12-05 NOTE — DISCHARGE INSTRUCTIONS
Concepción Olea  [id number]  1950              Procedure  Discharge Instructions:      Discomfort:  Redness at IV site- apply warm compress to area; if redness or soreness persist- contact your physician  There may be a slight amount of blood passed from the rectum  Gaseous discomfort- walking, belching will help relieve any discomfort  You may not operate a vehicle for 12 hours  You may not engage in an occupation involving machinery or appliances for rest of today  You may not drink alcoholic beverages for at least 12 hours  Avoid making any critical decisions for at least 24 hour  DIET:   You may resume your normal diet today. You should not overeat or \"feast\" today as your abdomen may become distended or uncomfortable. MEDICATIONS:   I reconciled this list from the list you gave us when you came today for the procedure. Please clarify with me, your primary care physician and the nurse who is discharging you if we have any discrepancies. Aspirin and or non-steroidal medication (Ibuprofen, Motrin, naproxen, etc.) is ok in limited quantities. ACTIVITY:  You may resume your normal daily activities it is recommended that you spend the remainder of the day resting -  avoid any strenuous activity. CALL M.D. ANY SIGN OF:  Increasing pain, nausea, vomiting  Abdominal distension (swelling)  New increased bleeding (oral or rectal)  Fever (chills)        Follow-up Instructions:   No biopsies. Telephone #  [phone]  Follow up visit as needed.         Kurt Cuenca MD  8:08 AM  12/5/2019

## 2019-12-05 NOTE — H&P
Pre-endoscopy H and P    The patient was seen and examined in the endoscopy suite. The airway was assessed and docuemented. The problem list, past medical history, and medications were reviewed.      Patient Active Problem List   Diagnosis Code    History of Vincent's esophagus Z87.19    Screen for colon cancer Z12.11     Social History     Socioeconomic History    Marital status:      Spouse name: Not on file    Number of children: Not on file    Years of education: Not on file    Highest education level: Not on file   Occupational History    Not on file   Social Needs    Financial resource strain: Not on file    Food insecurity:     Worry: Not on file     Inability: Not on file    Transportation needs:     Medical: Not on file     Non-medical: Not on file   Tobacco Use    Smoking status: Never Smoker    Smokeless tobacco: Never Used   Substance and Sexual Activity    Alcohol use: Not Currently    Drug use: No    Sexual activity: Not on file   Lifestyle    Physical activity:     Days per week: Not on file     Minutes per session: Not on file    Stress: Not on file   Relationships    Social connections:     Talks on phone: Not on file     Gets together: Not on file     Attends Confucianism service: Not on file     Active member of club or organization: Not on file     Attends meetings of clubs or organizations: Not on file     Relationship status: Not on file    Intimate partner violence:     Fear of current or ex partner: Not on file     Emotionally abused: Not on file     Physically abused: Not on file     Forced sexual activity: Not on file   Other Topics Concern    Not on file   Social History Narrative    Not on file     Past Medical History:   Diagnosis Date    Acid indigestion     Calculus of kidney     GERD (gastroesophageal reflux disease)     Hernia, inguinal, left     History of Vincent's esophagus 1/10/2017    In the past, none 2014    History of kidney stones 2012    has surgery 2012 and 3/2016   [facility] (hard of hearing)     bilateral hearing aides    Osteopenia     Dr Yudi Preciado gives him Prolia q6 months    Screen for colon cancer 2019    Seizures ([address]     after syncopal episodes; as of 17 pt states this was \"4-5 years ago\"    Syncope, vasovagal     as of 17 pt states this was \"4-5 years ago\"     The patient has a family history of na    Prior to Admission Medications   Prescriptions Last Dose Informant Patient Reported? Taking?   calcium citrate-vitamin D3 (CITRACAL + D) tablet 2019 at Unknown time  Yes Yes   Sig: Take 1 Tab by mouth daily. cholecalciferol (VITAMIN D3) 400 unit tab tablet 2019 at Unknown time  Yes Yes   Sig: Take 400 Units by mouth two (2) times a day. denosumab (PROLIA) 60 mg/mL injection 2019  Yes Yes   Si mg by SubCUTAneous route. q6 months - 2016   lovastatin (MEVACOR) 20 mg tablet 2019 at Unknown time  Yes Yes   Sig: Take 20 mg by mouth nightly. multivitamin (ONE A DAY) tablet 2019 at Unknown time  Yes Yes   Sig: Take 1 Tab by mouth daily. omeprazole (PRILOSEC) 20 mg capsule 2019 at Unknown time  Yes Yes   Sig: Take 20 mg by mouth every morning. potassium 99 mg tablet 2019 at Unknown time  Yes Yes   Sig: Take 99 mg by mouth daily. prasterone, dhea, (DHEA) 50 mg cap 2019 at Unknown time  Yes Yes   Sig: Take  by mouth. ranitidine (ZANTAC) 150 mg tablet 2019 at Unknown time  Yes Yes   Sig: Take 150 mg by mouth nightly. Facility-Administered Medications: None           The review of systems is:  negative for shortness of breath or chest pain      The heart, lungs, and mental status were satisfactory for the administration of anesthesia sedation and for the procedure. I discussed with the patient the objectives, risks, consequences and alternatives to the procedure.       Mirta Israel MD  2019  7:46 AM

## 2019-12-05 NOTE — ANESTHESIA POSTPROCEDURE EVALUATION
Procedure(s):  COLONOSCOPY.    total IV anesthesia    Anesthesia Post Evaluation        Patient location during evaluation: PACU  Note status: Adequate. Level of consciousness: responsive to verbal stimuli and sleepy but conscious  Pain management: satisfactory to patient  Airway patency: patent  Anesthetic complications: no  Cardiovascular status: acceptable  Respiratory status: acceptable  Hydration status: acceptable  Comments: +Post-Anesthesia Evaluation and Assessment    Patient: Beto Barriga MRN: [id number]  SSN: [id number]   YOB: 1950  Age: 78 y.o. Sex: male      Cardiovascular Function/Vital Signs    /65   Pulse 70   Temp 36.5 °C (97.7 °F)   Resp 13   Ht 5' 7\" (1.702 m)   Wt 75.4 kg (166 lb 5 oz)   SpO2 96%   BMI 26.05 kg/m²     Patient is status post Procedure(s):  COLONOSCOPY. Nausea/Vomiting: Controlled. Postoperative hydration reviewed and adequate. Pain:  Pain Scale 1: Numeric (0 - 10) (12/05/19 0913)  Pain Intensity 1: 0 (12/05/19 0913)   Managed. Neurological Status: At baseline. Mental Status and Level of Consciousness: Arousable. Pulmonary Status:   O2 Device: Room air (12/05/19 0913)   Adequate oxygenation and airway patent. Complications related to anesthesia: None    Post-anesthesia assessment completed. No concerns. Signed By: Celena Miranda DO    12/5/2019  Post anesthesia nausea and vomiting:  controlled      Vitals Value Taken Time   BP 95/69 12/5/2019  9:18 AM   Temp 36.5 °C (97.7 °F) 12/5/2019  8:12 AM   Pulse 70 12/5/2019  9:20 AM   Resp 18 12/5/2019  9:20 AM   SpO2 97 % 12/5/2019  9:19 AM   Vitals shown include unvalidated device data.

## 2019-12-05 NOTE — PERIOP NOTES
Endoscope was pre-cleaned at the bedside immediately following procedure by [facility] ET.

## 2020-01-04 PROBLEM — Z12.11 SCREEN FOR COLON CANCER: Status: RESOLVED | Noted: 2019-12-05 | Resolved: 2020-01-04

## 2020-02-13 RX ORDER — FAMOTIDINE 20 MG/1
20 TABLET, FILM COATED ORAL
COMMUNITY

## 2020-02-17 ENCOUNTER — ANESTHESIA EVENT (OUTPATIENT)
Dept: SURGERY | Age: 70
End: 2020-02-17
Payer: MEDICARE

## 2020-02-18 ENCOUNTER — HOSPITAL ENCOUNTER (OUTPATIENT)
Age: 70
Setting detail: OUTPATIENT SURGERY
Discharge: HOME | End: 2020-02-18
Attending: SPECIALIST | Admitting: SPECIALIST
Payer: MEDICARE

## 2020-02-18 ENCOUNTER — ANESTHESIA (OUTPATIENT)
Dept: SURGERY | Age: 70
End: 2020-02-18
Payer: MEDICARE

## 2020-02-18 VITALS
TEMPERATURE: 97.8 F | RESPIRATION RATE: 11 BRPM | BODY MASS INDEX: 26.68 KG/M2 | SYSTOLIC BLOOD PRESSURE: 106 MMHG | OXYGEN SATURATION: 99 % | HEART RATE: 62 BPM | WEIGHT: 170 LBS | HEIGHT: 67 IN | DIASTOLIC BLOOD PRESSURE: 78 MMHG

## 2020-02-18 PROCEDURE — 76210000040 HC AMBSU PH I REC FIRST 0.5 HR: Performed by: SPECIALIST

## 2020-02-18 PROCEDURE — 74011250636 HC RX REV CODE- 250/636: Performed by: NURSE ANESTHETIST, CERTIFIED REGISTERED

## 2020-02-18 PROCEDURE — 74011000250 HC RX REV CODE- 250: Performed by: NURSE ANESTHETIST, CERTIFIED REGISTERED

## 2020-02-18 PROCEDURE — 76210000046 HC AMBSU PH II REC FIRST 0.5 HR: Performed by: SPECIALIST

## 2020-02-18 PROCEDURE — 77030019988 HC FCPS ENDOSC DISP BSC -B: Performed by: SPECIALIST

## 2020-02-18 PROCEDURE — 77030021352 HC CBL LD SYS DISP COVD -B: Performed by: SPECIALIST

## 2020-02-18 PROCEDURE — 88305 TISSUE EXAM BY PATHOLOGIST: CPT

## 2020-02-18 PROCEDURE — 76060000073 HC AMB SURG ANES FIRST 0.5 HR: Performed by: SPECIALIST

## 2020-02-18 PROCEDURE — 74011250636 HC RX REV CODE- 250/636: Performed by: ANESTHESIOLOGY

## 2020-02-18 PROCEDURE — 76030000002 HC AMB SURG OR TIME FIRST 0.: Performed by: SPECIALIST

## 2020-02-18 RX ORDER — ONDANSETRON 2 MG/ML
4 INJECTION INTRAMUSCULAR; INTRAVENOUS AS NEEDED
Status: DISCONTINUED | OUTPATIENT
Start: 2020-02-18 | End: 2020-02-18 | Stop reason: HOSPADM

## 2020-02-18 RX ORDER — SODIUM CHLORIDE 0.9 % (FLUSH) 0.9 %
5-40 SYRINGE (ML) INJECTION AS NEEDED
Status: DISCONTINUED | OUTPATIENT
Start: 2020-02-18 | End: 2020-02-18 | Stop reason: HOSPADM

## 2020-02-18 RX ORDER — DIPHENHYDRAMINE HYDROCHLORIDE 50 MG/ML
12.5 INJECTION, SOLUTION INTRAMUSCULAR; INTRAVENOUS AS NEEDED
Status: DISCONTINUED | OUTPATIENT
Start: 2020-02-18 | End: 2020-02-18 | Stop reason: HOSPADM

## 2020-02-18 RX ORDER — SODIUM CHLORIDE 0.9 % (FLUSH) 0.9 %
5-40 SYRINGE (ML) INJECTION EVERY 8 HOURS
Status: DISCONTINUED | OUTPATIENT
Start: 2020-02-18 | End: 2020-02-18 | Stop reason: HOSPADM

## 2020-02-18 RX ORDER — FENTANYL CITRATE 50 UG/ML
25 INJECTION, SOLUTION INTRAMUSCULAR; INTRAVENOUS
Status: DISCONTINUED | OUTPATIENT
Start: 2020-02-18 | End: 2020-02-18 | Stop reason: HOSPADM

## 2020-02-18 RX ORDER — SODIUM CHLORIDE, SODIUM LACTATE, POTASSIUM CHLORIDE, CALCIUM CHLORIDE 600; 310; 30; 20 MG/100ML; MG/100ML; MG/100ML; MG/100ML
25 INJECTION, SOLUTION INTRAVENOUS CONTINUOUS
Status: DISCONTINUED | OUTPATIENT
Start: 2020-02-18 | End: 2020-02-18 | Stop reason: HOSPADM

## 2020-02-18 RX ORDER — PROPOFOL 10 MG/ML
INJECTION, EMULSION INTRAVENOUS AS NEEDED
Status: DISCONTINUED | OUTPATIENT
Start: 2020-02-18 | End: 2020-02-18 | Stop reason: HOSPADM

## 2020-02-18 RX ORDER — LIDOCAINE HYDROCHLORIDE 10 MG/ML
0.1 INJECTION, SOLUTION EPIDURAL; INFILTRATION; INTRACAUDAL; PERINEURAL AS NEEDED
Status: DISCONTINUED | OUTPATIENT
Start: 2020-02-18 | End: 2020-02-18 | Stop reason: HOSPADM

## 2020-02-18 RX ORDER — LIDOCAINE HYDROCHLORIDE 20 MG/ML
INJECTION, SOLUTION EPIDURAL; INFILTRATION; INTRACAUDAL; PERINEURAL AS NEEDED
Status: DISCONTINUED | OUTPATIENT
Start: 2020-02-18 | End: 2020-02-18 | Stop reason: HOSPADM

## 2020-02-18 RX ADMIN — LIDOCAINE HYDROCHLORIDE 80 MG: 20 INJECTION, SOLUTION EPIDURAL; INFILTRATION; INTRACAUDAL; PERINEURAL at 14:19

## 2020-02-18 RX ADMIN — PROPOFOL 70 MG: 10 INJECTION, EMULSION INTRAVENOUS at 14:27

## 2020-02-18 RX ADMIN — PROPOFOL 40 MG: 10 INJECTION, EMULSION INTRAVENOUS at 14:29

## 2020-02-18 RX ADMIN — PROPOFOL 30 MG: 10 INJECTION, EMULSION INTRAVENOUS at 14:33

## 2020-02-18 RX ADMIN — SODIUM CHLORIDE, SODIUM LACTATE, POTASSIUM CHLORIDE, AND CALCIUM CHLORIDE 25 ML/HR: 600; 310; 30; 20 INJECTION, SOLUTION INTRAVENOUS at 13:08

## 2020-02-18 RX ADMIN — PROPOFOL 30 MG: 10 INJECTION, EMULSION INTRAVENOUS at 14:31

## 2020-02-18 RX ADMIN — PROPOFOL 30 MG: 10 INJECTION, EMULSION INTRAVENOUS at 14:35

## 2020-02-18 NOTE — PERIOP NOTES
Permission received to review discharge instructions and discuss private health information with wife Lavell Mock

## 2020-02-18 NOTE — ANESTHESIA PREPROCEDURE EVALUATION
Anesthetic History   No history of anesthetic complications            Review of Systems / Medical History  Patient summary reviewed, nursing notes reviewed and pertinent labs reviewed    Pulmonary  Within defined limits                 Neuro/Psych     seizures (associated with vasovagal episodes only (dehydration))         Cardiovascular              Hyperlipidemia    Exercise tolerance: >4 METS  Comments: Denies hrt probs or chest pain   GI/Hepatic/Renal     GERD: well controlled    Renal disease: stones      Comments: H/o Vincent's Endo/Other  Within defined limits           Other Findings   Comments: [place]         Physical Exam    Airway  Mallampati: I  TM Distance: 4 - 6 cm  Neck ROM: normal range of motion   Mouth opening: Normal     Cardiovascular    Rhythm: regular  Rate: normal      Pertinent negatives: No murmur   Dental  No notable dental hx       Pulmonary  Breath sounds clear to auscultation               Abdominal  GI exam deferred       Other Findings            Anesthetic Plan    ASA: 2  Anesthesia type: total IV anesthesia and general          Induction: Intravenous  Anesthetic plan and risks discussed with: Patient

## 2020-02-18 NOTE — DISCHARGE INSTRUCTIONS
Maria Luisa Nguyen  [id number]  1950              Procedure  Discharge Instructions:      Discomfort:  Redness at IV site- apply warm compress to area; if redness or soreness persist- contact your physician  There may be a slight amount of blood passed from the rectum  Gaseous discomfort- walking, belching will help relieve any discomfort  You may not operate a vehicle for 12 hours  You may not engage in an occupation involving machinery or appliances for rest of today  You may not drink alcoholic beverages for at least 12 hours  Avoid making any critical decisions for at least 24 hour  DIET:   You may resume your normal diet today. You should not overeat or \"feast\" today as your abdomen may become distended or uncomfortable. MEDICATIONS:   I reconciled this list from the list you gave us when you came today for the procedure. Please clarify with me, your primary care physician and the nurse who is discharging you if we have any discrepancies. Aspirin and or non-steroidal medication (Ibuprofen, Motrin, naproxen, etc.) is ok in limited quantities. ACTIVITY:  You may resume your normal daily activities it is recommended that you spend the remainder of the day resting -  avoid any strenuous activity. CALL M.D. ANY SIGN OF:  Increasing pain, nausea, vomiting  Abdominal distension (swelling)  New increased bleeding (oral or rectal)  Fever (chills)  Pain in chest area  Bloody discharge from nose or mouth  Shortness of breath          Follow-up Instructions:   Call Dr. Jadyn Turcios for the results of  biopsy in approximately one week  Telephone #  [phone]  Follow up visit as previously scheduled. Bishop Butts MD  2:48 PM  2/18/2020         DO NOT TAKE SLEEPING MEDICATIONS OR ANTIANXIETY MEDICATIONS WHILE TAKING NARCOTIC PAIN MEDICATIONS,  ESPECIALLY THE NIGHT OF ANESTHESIA. CPAP PATIENTS BE SURE TO WEAR MACHINE WHENEVER NAPPING OR SLEEPING.     DISCHARGE SUMMARY from Nurse    The following personal items collected during your admission are returned to you:   Dental Appliance: Dental Appliances: None  Vision: Visual Aid: Glasses(given to wife Jaya Luna )  Hearing Aid:    Jewelry:    Clothing:    Other Valuables:    Valuables sent to safe:        PATIENT INSTRUCTIONS:    Anesthesia Discharge Instructions for Procedural Area requiring Sedation (MAC Anesthesia, Cath Lab, Endo and Radiology): You have been given medications during your procedure that may affect your memory and mental judgement for the next 24 hours. During this time frame for your safety, please follow the instructions listed below :    Have a responsible adult to drive you home and be with you for at least 12 hours.  Rest today and resume normal activities tomorrow.  Start with a soft bland diet and advance as tolerated to your recommended diet.  Do not drive any motor vehicle or operate mechanical or electrical equipment prior to [employer].  Avoid making critical decisions or signing legal documents prior to 6am tomorrow.  Do not drink alcohol prior to 6am tomorrow.  If you have sleep apnea and you plan to go home and take a nap, please use your CPAP machine not only at bedtime, but also while napping for 24 hours.  If you notice any redness or swelling on parts of your body where IV medications were given, place a warm wet washcloth over the area for 20 minutes at a time until the redness or swelling goes away. If you still have redness or swelling after 2-3 days, please call us. · You will receive a Post Operative Call from one of the Recovery Room Nurses on the day after your surgery to check on you. It is very important for us to know how you are recovering after your surgery. If you have an issue or need to speak with someone, please call your surgeon, do not wait for the post operative call.     · You may receive an e-mail or letter in the mail from [facility] regarding your experience with us in the Ambulatory Surgery Unit. Your feedback is valuable to us and we appreciate your participation in the survey. · If the above instructions are not adequate, please contact ALEXANDER Barr, RN Perianesthesia Manager at [phone]. If you are having problems after your surgery, call the physician at his office number. · We wish you a speedy recovery ? What to do at Home:      *  Please give a list of your current medications to your Primary Care Provider. *  Please update this list whenever your medications are discontinued, doses are      changed, or new medications (including over-the-counter products) are added. *  Please carry medication information at all times in case of emergency situations. If you have not received your influenza and/or pneumococcal vaccine, please follow up with your primary care physician. The discharge information has been reviewed with the patient and caregiver. The patient and caregiver verbalized understanding.

## 2020-02-18 NOTE — PERIOP NOTES
Sander [facility]  1950  [id number]    Situation:  Verbal report given from: FRANCISCO Morales and Bren Vaughn CRNA  Procedure: Procedure(s):  ESOPHAGOGASTRODUODENOSCOPY (EGD)  ESOPHAGOGASTRODUODENAL (EGD) BIOPSY    Background:    Preoperative diagnosis: RODRIGUEZ'S ESOPHAGUS    Postoperative diagnosis: HIATAL HERNIA, RODRIGUEZ'S ESOPHAGUS     :  Dr. Kevin Xei    Assistant(s): Circ-1: Flash Esteves RN  Scrub Tech-1: Cathy Monae    Specimens:   ID Type Source Tests Collected by Time Destination   1 : [address] BIOPSY  Preservative GE Jack Gee MD 2/18/2020 2597 Pathology       Assessment:  Intra-procedure medications   Propofol 200 mg      Anesthesia gave intra-procedure sedation and medications, see anesthesia flow sheet     Intravenous fluids: LR@ KVO     Vital signs stable     Abdominal assessment: round and soft       Recommendation:    Permission to share finding with wife    All side rails up, bed in low position, wheels locked. Nurse at bedside.

## 2020-02-18 NOTE — PERIOP NOTES
Patient states that wife Maday Camacho will be with them for at least 24 hours following today's procedure.

## 2020-02-18 NOTE — ANESTHESIA POSTPROCEDURE EVALUATION
Procedure(s):  ESOPHAGOGASTRODUODENOSCOPY (EGD)  ESOPHAGOGASTRODUODENAL (EGD) BIOPSY.     total IV anesthesia, general    Anesthesia Post Evaluation      Multimodal analgesia: multimodal analgesia not used between 6 hours prior to anesthesia start to PACU discharge  Patient location during evaluation: PACU  Patient participation: complete - patient participated  Level of consciousness: awake and alert  Pain score: 0  Airway patency: patent  Anesthetic complications: no  Cardiovascular status: acceptable  Respiratory status: acceptable  Hydration status: acceptable  Post anesthesia nausea and vomiting:  none      Vitals Value Taken Time   /78 2/18/2020  3:02 PM   Temp 36.6 °C (97.8 °F) 2/18/2020  3:00 PM   Pulse 62 2/18/2020  3:02 PM   Resp 11 2/18/2020  3:02 PM   SpO2 99 % 2/18/2020  3:02 PM

## 2020-02-18 NOTE — H&P
Pre-endoscopy H and P    The patient was seen and examined in the endoscopy suite. The airway was assessed and docuemented. The problem list, past medical history, and medications were reviewed.      Patient Active Problem List   Diagnosis Code    History of Vincent's esophagus Z87.19     Social History     Socioeconomic History    Marital status:      Spouse name: Not on file    Number of children: Not on file    Years of education: Not on file    Highest education level: Not on file   Occupational History    Not on file   Social Needs    Financial resource strain: Not on file    Food insecurity:     Worry: Not on file     Inability: Not on file    Transportation needs:     Medical: Not on file     Non-medical: Not on file   Tobacco Use    Smoking status: Former Smoker    Smokeless tobacco: Never Used    Tobacco comment: As a teen    Substance and Sexual Activity    Alcohol use: Not Currently    Drug use: No    Sexual activity: Not on file   Lifestyle    Physical activity:     Days per week: Not on file     Minutes per session: Not on file    Stress: Not on file   Relationships    Social connections:     Talks on phone: Not on file     Gets together: Not on file     Attends Episcopalian service: Not on file     Active member of club or organization: Not on file     Attends meetings of clubs or organizations: Not on file     Relationship status: Not on file    Intimate partner violence:     Fear of current or ex partner: Not on file     Emotionally abused: Not on file     Physically abused: Not on file     Forced sexual activity: Not on file   Other Topics Concern    Not on file   Social History Narrative    Not on file     Past Medical History:   Diagnosis Date    Acid indigestion     Calculus of kidney     GERD (gastroesophageal reflux disease)     Hernia, inguinal, left     History of Vincent's esophagus 1/10/2017    In the past, none 2014    History of kidney stones 2012    has surgery 2012 and 3/2016   [facility] (hard of hearing)     bilateral hearing aides    Osteopenia     Dr Luis Ballard gives him Prolia q6 months    Screen for colon cancer 2019    Seizures ([address]     after syncopal episodes; as of 17 pt states this was \"4-5 years ago\"    Syncope, vasovagal     as of 17 pt states this was \"4-5 years ago\"     The patient has a family history of na    Prior to Admission Medications   Prescriptions Last Dose Informant Patient Reported? Taking?   calcium citrate-vitamin D3 (CITRACAL + D) tablet 2020 at pm  Yes Yes   Sig: Take 1 Tab by mouth daily. cholecalciferol (VITAMIN D3) 400 unit tab tablet 2020 at am  Yes Yes   Sig: Take 400 Units by mouth two (2) times a day. denosumab (PROLIA) 60 mg/mL injection 2019  Yes Yes   Si mg by SubCUTAneous route. q6 months - 2016   famotidine (PEPCID) 20 mg tablet 2020 at pm  Yes Yes   Sig: Take 20 mg by mouth nightly. lovastatin (MEVACOR) 20 mg tablet 2020 at pm  Yes Yes   Sig: Take 20 mg by mouth nightly. multivitamin (ONE A DAY) tablet 2020 at am  Yes Yes   Sig: Take 1 Tab by mouth daily. omeprazole (PRILOSEC) 20 mg capsule 2020 at 0700  Yes Yes   Sig: Take 20 mg by mouth every morning. potassium 99 mg tablet 2020 at am  Yes Yes   Sig: Take 99 mg by mouth daily. prasterone, dhea, (DHEA) 50 mg cap 2020 at pm  Yes Yes   Sig: Take  by mouth. Facility-Administered Medications: None           The review of systems is:  negative for shortness of breath or chest pain      The heart, lungs, and mental status were satisfactory for the administration of anesthesia sedation and for the procedure. I discussed with the patient the objectives, risks, consequences and alternatives to the procedure.       Ramy Thurman MD  2020  1:44 PM

## 2020-02-18 NOTE — H&P
Esophagogastroduodenoscopy    Indications:  History of Rodriguez's esophagus    Medications:  See anesthesia form    Assistants:  Padmini mendoza      Post procedure diagnosis:  HIATAL HERNIA, RODRIGUEZ'S ESOPHAGUS     Description of Procedure:    Prior to the procedure its objectives, risks, consequences and alternatives were discussed with the patient who then elected to proceed. The Olympus video endoscope was inserted under direct vision into the mouth and then into the esophagus. The esophagus looked normal to the distal esophagus. There were three complex tongues of colunmar mucosa from the mid esophagus at 32 cm to the z-line at 35 cm. A three cm hiatal hernia was seen with the diaphragm pinch at 38 cm. There were no diagnostic abnormalities of the body, fundus, antrum, cardia and incisura of the stomach. This included direct and retroflexion examination. The first and second portion of the duodenum appeared normal.  I took biopsies of the ge junction. Complications: There were no apparent complications and the patient tolerated the procedure well.         Implants:  none    Estimated Blood Loss:  none  Specimens Removed:  ge junction for Rodriguez's esophagus  Impressions:  Likely short segment Rodriguez's  Hiatal hernia      Signed By: Sofi Franco MD                        February 18, 2020     2:45 PM

## 2020-02-18 NOTE — PERIOP NOTES
1440: Patient received to PACU, VSS. Patient drowsy but arouses to voice. Patient's wife at bedside. Patient awake and alert tolerating liquids. 1500: Discharge instructions given. Patient and wife verbalize understanding of instructions and follow up appointment. Patient and wife state ready for discharge. IV removed. Patient discharged at this time by wheelchair with belongings, wife to provide transportation home.

## 2020-03-06 NOTE — PROCEDURES
Esophagogastroduodenoscopy     Indications:  History of Rodriguez's esophagus     Medications:  See anesthesia form     Assistants:  Maria mendoza        Post procedure diagnosis:  HIATAL HERNIA, RODRIGUEZ'S ESOPHAGUS      Description of Procedure:    Prior to the procedure its objectives, risks, consequences and alternatives were discussed with the patient who then elected to proceed. The Olympus video endoscope was inserted under direct vision into the mouth and then into the esophagus. The esophagus looked normal to the distal esophagus. There were three complex tongues of colunmar mucosa from the mid esophagus at 32 cm to the z-line at 35 cm. A three cm hiatal hernia was seen with the diaphragm pinch at 38 cm. There were no diagnostic abnormalities of the body, fundus, antrum, cardia and incisura of the stomach. This included direct and retroflexion examination. The first and second portion of the duodenum appeared normal.  I took biopsies of the ge junction.             Complications:  There were no apparent complications and the patient tolerated the procedure well.          Implants:  none     Estimated Blood Loss:  none  Specimens Removed:  ge junction for Rodriguez's esophagus  Impressions:  Likely short segment Rodriguez's  Hiatal hernia        Signed By: Americo Zhao MD                        February 18, 2020                           2:45 PM

## 2020-03-07 ENCOUNTER — HOSPITAL ENCOUNTER (OUTPATIENT)
Dept: MRI IMAGING | Age: 70
Discharge: HOME | End: 2020-03-07
Attending: OTOLARYNGOLOGY
Payer: MEDICARE

## 2020-03-07 DIAGNOSIS — Z78.9 NON-SMOKER: ICD-10-CM

## 2020-03-07 DIAGNOSIS — H90.3 SENSORINEURAL HEARING LOSS, ASYMMETRICAL: ICD-10-CM

## 2020-03-07 DIAGNOSIS — Z00.8 OTHER SPECIFIED GENERAL MEDICAL EXAMINATION: ICD-10-CM

## 2020-03-07 PROCEDURE — 74011250636 HC RX REV CODE- 250/636: Performed by: OTOLARYNGOLOGY

## 2020-03-07 PROCEDURE — A9575 INJ GADOTERATE MEGLUMI 0.1ML: HCPCS | Performed by: OTOLARYNGOLOGY

## 2020-03-07 PROCEDURE — 70553 MRI BRAIN STEM W/O & W/DYE: CPT

## 2020-03-07 RX ORDER — GADOTERATE MEGLUMINE 376.9 MG/ML
15 INJECTION INTRAVENOUS
Status: COMPLETED | OUTPATIENT
Start: 2020-03-07 | End: 2020-03-07

## 2020-03-07 RX ADMIN — GADOTERATE MEGLUMINE 15 ML: 376.9 INJECTION INTRAVENOUS at 11:46

## 2020-05-22 ENCOUNTER — HOSPITAL ENCOUNTER (OUTPATIENT)
Dept: INFUSION THERAPY | Age: 70
Discharge: HOME | End: 2020-05-22
Payer: MEDICARE

## 2020-05-22 VITALS
TEMPERATURE: 97.7 F | BODY MASS INDEX: 27.11 KG/M2 | RESPIRATION RATE: 16 BRPM | SYSTOLIC BLOOD PRESSURE: 109 MMHG | WEIGHT: 173.1 LBS | HEART RATE: 96 BPM | DIASTOLIC BLOOD PRESSURE: 74 MMHG

## 2020-05-22 PROCEDURE — 74011250636 HC RX REV CODE- 250/636

## 2020-05-22 PROCEDURE — 96372 THER/PROPH/DIAG INJ SC/IM: CPT

## 2020-05-22 RX ADMIN — DENOSUMAB 60 MG: 60 INJECTION SUBCUTANEOUS at 11:10

## 2020-05-22 NOTE — PROGRESS NOTES
[facility] Visit Note:  Arrived - 1    Patient brought lab results from 5/7/20. Calcium 9.4 -- results scanned into media    Visit Vitals  /74   Pulse 96   Temp 97.7 °F (36.5 °C)   Resp 16   Wt 78.5 kg (173 lb 1.6 oz)   BMI 27.11 kg/m²       Assessment - unchanged. Reviewed Prolia info. Pt denies any recent or upcoming dental work. Prolia 60mg SQ slowly in left arm. 1110 - Tolerated well. No reaction noted. Reviewed Prolia D/C instructions. Verbalized understanding. Pt denies any acute problems/changes. Discharged from [facility] ambulatory. No distress.  Next appt: 11/23/20

## 2020-11-23 ENCOUNTER — HOSPITAL ENCOUNTER (OUTPATIENT)
Dept: INFUSION THERAPY | Age: 70
Discharge: HOME | End: 2020-11-23
Payer: MEDICARE

## 2020-11-23 VITALS
HEART RATE: 80 BPM | RESPIRATION RATE: 16 BRPM | OXYGEN SATURATION: 97 % | DIASTOLIC BLOOD PRESSURE: 70 MMHG | SYSTOLIC BLOOD PRESSURE: 97 MMHG | TEMPERATURE: 97.7 F

## 2020-11-23 PROCEDURE — 96372 THER/PROPH/DIAG INJ SC/IM: CPT

## 2020-11-23 PROCEDURE — 74011250636 HC RX REV CODE- 250/636: Performed by: SPECIALIST

## 2020-11-23 RX ADMIN — DENOSUMAB 60 MG: 60 INJECTION SUBCUTANEOUS at 10:37

## 2020-12-09 ENCOUNTER — TRANSCRIBE ORDER (OUTPATIENT)
Dept: SCHEDULING | Age: 70
End: 2020-12-09

## 2020-12-09 DIAGNOSIS — M81.0 OSTEOPOROSIS: Primary | ICD-10-CM

## 2021-02-02 ENCOUNTER — HOSPITAL ENCOUNTER (OUTPATIENT)
Dept: BONE DENSITY | Age: 71
Discharge: HOME | End: 2021-02-02
Attending: SPECIALIST
Payer: MEDICARE

## 2021-02-02 DIAGNOSIS — M81.0 OSTEOPOROSIS: ICD-10-CM

## 2021-02-02 PROCEDURE — 77080 DXA BONE DENSITY AXIAL: CPT

## 2021-05-24 ENCOUNTER — HOSPITAL ENCOUNTER (OUTPATIENT)
Dept: INFUSION THERAPY | Age: 71
Discharge: HOME | End: 2021-05-24
Payer: MEDICARE

## 2021-05-24 VITALS
SYSTOLIC BLOOD PRESSURE: 101 MMHG | HEART RATE: 52 BPM | TEMPERATURE: 97.8 F | DIASTOLIC BLOOD PRESSURE: 62 MMHG | RESPIRATION RATE: 18 BRPM

## 2021-05-24 PROCEDURE — 74011250636 HC RX REV CODE- 250/636: Performed by: SPECIALIST

## 2021-05-24 PROCEDURE — 96372 THER/PROPH/DIAG INJ SC/IM: CPT

## 2021-05-24 RX ORDER — GUAIFENESIN 100 MG/5ML
81 LIQUID (ML) ORAL DAILY
COMMUNITY

## 2021-05-24 RX ADMIN — DENOSUMAB 60 MG: 60 INJECTION SUBCUTANEOUS at 10:29

## 2021-05-24 NOTE — PROGRESS NOTES
[facility] Progress Note    1010  Pt arrived in stable condition and in no distress for Prolia    Assessment completed, patient has no complaints. Patient brought copy of labs from 5/4/2021 - Calcium=9.5--scanned in. Patient denies recent or upcoming dental procedures. Patient denied having any symptoms of COVID-19, i.e. SOB, coughing, fever, or generally not feeling well. Also denies having been exposed to COVID-19 recently or having had any recent contact with family/friend that has a pending COVID test.      Patient Vitals for the past 12 hrs:   Temp Pulse Resp BP   05/24/21 1012 97.8 °F (36.6 °C) (!) 52 18 101/62        The following medications administered:  Medications Administered     denosumab (PROLIA) injection 60 mg     Admin Date  05/24/2021 Action  Given Dose  60 mg Route  SubCUTAneous Administered By  Mary Ann Galeana RN                Pt tolerated treatment well. No s/s of adverse reaction noted. Pt provided with education on possible side effects of medication along with discharge instructions. Pt verbalized understanding. 1030  Pt discharged in no acute distress.  Next appointment:    Future Appointments   Date Time Provider Rigoberto Roper   11/29/2021 10:30 AM CHAIR 3  [facility] REG

## 2021-11-29 ENCOUNTER — HOSPITAL ENCOUNTER (OUTPATIENT)
Dept: INFUSION THERAPY | Age: 71
Discharge: HOME | End: 2021-11-29
Payer: MEDICARE

## 2021-11-29 VITALS
HEART RATE: 66 BPM | SYSTOLIC BLOOD PRESSURE: 97 MMHG | OXYGEN SATURATION: 99 % | TEMPERATURE: 97.6 F | WEIGHT: 165.5 LBS | BODY MASS INDEX: 25.92 KG/M2 | DIASTOLIC BLOOD PRESSURE: 67 MMHG | RESPIRATION RATE: 17 BRPM

## 2021-11-29 PROCEDURE — 74011250636 HC RX REV CODE- 250/636: Performed by: SPECIALIST

## 2021-11-29 PROCEDURE — 96372 THER/PROPH/DIAG INJ SC/IM: CPT

## 2021-11-29 RX ORDER — VITAMIN E 1000 UNIT
1000 CAPSULE ORAL
COMMUNITY

## 2021-11-29 RX ORDER — RANITIDINE 150 MG/1
150 TABLET, FILM COATED ORAL 2 TIMES DAILY
COMMUNITY

## 2021-11-29 RX ADMIN — DENOSUMAB 60 MG: 60 INJECTION SUBCUTANEOUS at 11:27

## 2022-03-19 PROBLEM — Z87.19 HISTORY OF BARRETT'S ESOPHAGUS: Status: ACTIVE | Noted: 2017-01-10

## 2022-05-31 ENCOUNTER — HOSPITAL ENCOUNTER (OUTPATIENT)
Dept: INFUSION THERAPY | Age: 72
Discharge: HOME | End: 2022-05-31
Payer: MEDICARE

## 2022-05-31 VITALS
RESPIRATION RATE: 18 BRPM | DIASTOLIC BLOOD PRESSURE: 65 MMHG | WEIGHT: 163.6 LBS | BODY MASS INDEX: 25.62 KG/M2 | OXYGEN SATURATION: 97 % | SYSTOLIC BLOOD PRESSURE: 94 MMHG | HEART RATE: 81 BPM | TEMPERATURE: 96.6 F

## 2022-05-31 PROCEDURE — 96372 THER/PROPH/DIAG INJ SC/IM: CPT

## 2022-05-31 PROCEDURE — 74011250636 HC RX REV CODE- 250/636: Performed by: SPECIALIST

## 2022-05-31 RX ADMIN — DENOSUMAB 60 MG: 60 INJECTION SUBCUTANEOUS at 10:24

## 2022-05-31 NOTE — PROGRESS NOTES
8000 [facility] Visit Note:  Arrived - 36  Labs obtained 5/20/22, Ca 9.6    Patient denied having any symptoms of COVID-19, i.e. SOB, coughing, fever, or generally not feeling well. Also denies having been exposed to COVID-19 recently or having had any recent contact with family/friend that has a pending COVID test.    Visit Vitals  BP 94/65 (BP 1 Location: Right upper arm, BP Patient Position: Sitting)   Pulse 81   Temp (!) 96.6 °F (35.9 °C)   Resp 18   Wt 74.2 kg (163 lb 9.6 oz)   SpO2 97%   BMI 25.62 kg/m²       Assessment - unchanged. Reviewed Prolia info. Pt denies any recent or upcoming dental work. Prolia 60mg SQ slowly in R arm. 1025 - Tolerated well. No reaction noted. Reviewed Prolia D/C instructions. Verbalized understanding. Pt denies any acute problems/changes. Discharged from [address] ambulatory. No distress.  Next appt: 11/29/22 at 1030

## 2022-11-29 ENCOUNTER — HOSPITAL ENCOUNTER (OUTPATIENT)
Dept: INFUSION THERAPY | Age: 72
Discharge: HOME | End: 2022-11-29
Payer: MEDICARE

## 2022-11-29 VITALS
HEART RATE: 60 BPM | HEIGHT: 67 IN | SYSTOLIC BLOOD PRESSURE: 120 MMHG | OXYGEN SATURATION: 100 % | BODY MASS INDEX: 25.25 KG/M2 | DIASTOLIC BLOOD PRESSURE: 79 MMHG | TEMPERATURE: 97.6 F | RESPIRATION RATE: 18 BRPM | WEIGHT: 160.9 LBS

## 2022-11-29 PROCEDURE — 96372 THER/PROPH/DIAG INJ SC/IM: CPT

## 2022-11-29 PROCEDURE — 74011250636 HC RX REV CODE- 250/636: Performed by: SPECIALIST

## 2022-11-29 RX ADMIN — DENOSUMAB 60 MG: 60 INJECTION SUBCUTANEOUS at 10:31

## 2022-11-29 NOTE — PROGRESS NOTES
8000 [facility] Visit Note:  Arrived - 1018      Visit Vitals  Visit Vitals  /79 (BP 1 Location: Left arm, BP Patient Position: Sitting)   Pulse 60   Temp 97.6 °F (36.4 °C)   Resp 18   Ht 5' 7\" (1.702 m)   Wt 73 kg (160 lb 14.4 oz)   SpO2 100%   BMI 25.20 kg/m²     Labs obtained on 11/16/22; Ca 9.3. MD office faxed results to [facility]. Medications:  Medications Administered       denosumab (PROLIA) injection 60 mg       Admin Date  11/29/2022 Action  Given Dose  60 mg Route  SubCUTAneous Administered By  Elvira WHITING               Assessment completed. Reviewed Prolia info. Pt denied any recent or upcoming dental work or dental pain. Prolia 60mg SQ slowly in Right arm. 1035 - Tolerated well. No reaction noted. Reviewed Prolia D/C instructions. Verbalized understanding. Pt denied any acute problems/changes. Discharged from [facility] ambulatory. No distress. Next appt: 5/30/23 at 1100.

## 2023-02-09 ENCOUNTER — TRANSCRIBE ORDER (OUTPATIENT)
Dept: SCHEDULING | Age: 73
End: 2023-02-09

## 2023-02-09 DIAGNOSIS — M81.0 OSTEOPOROSIS: Primary | ICD-10-CM

## 2023-02-15 ENCOUNTER — HOSPITAL ENCOUNTER (OUTPATIENT)
Age: 73
Setting detail: OUTPATIENT SURGERY
Discharge: HOME | End: 2023-02-15
Attending: SPECIALIST | Admitting: SPECIALIST
Payer: MEDICARE

## 2023-02-15 ENCOUNTER — ANESTHESIA EVENT (OUTPATIENT)
Dept: ENDOSCOPY | Age: 73
End: 2023-02-15
Payer: MEDICARE

## 2023-02-15 ENCOUNTER — ANESTHESIA (OUTPATIENT)
Dept: ENDOSCOPY | Age: 73
End: 2023-02-15
Payer: MEDICARE

## 2023-02-15 VITALS
RESPIRATION RATE: 18 BRPM | DIASTOLIC BLOOD PRESSURE: 70 MMHG | WEIGHT: 161.2 LBS | OXYGEN SATURATION: 97 % | TEMPERATURE: 98 F | HEIGHT: 67 IN | HEART RATE: 80 BPM | BODY MASS INDEX: 25.3 KG/M2 | SYSTOLIC BLOOD PRESSURE: 110 MMHG

## 2023-02-15 PROCEDURE — 74011250636 HC RX REV CODE- 250/636: Performed by: NURSE ANESTHETIST, CERTIFIED REGISTERED

## 2023-02-15 PROCEDURE — 76060000031 HC ANESTHESIA FIRST 0.5 HR: Performed by: SPECIALIST

## 2023-02-15 PROCEDURE — 74011000250 HC RX REV CODE- 250: Performed by: NURSE ANESTHETIST, CERTIFIED REGISTERED

## 2023-02-15 PROCEDURE — 76040000019: Performed by: SPECIALIST

## 2023-02-15 PROCEDURE — 77030019988 HC FCPS ENDOSC DISP BSC -B: Performed by: SPECIALIST

## 2023-02-15 PROCEDURE — 2709999900 HC NON-CHARGEABLE SUPPLY: Performed by: SPECIALIST

## 2023-02-15 PROCEDURE — 88305 TISSUE EXAM BY PATHOLOGIST: CPT

## 2023-02-15 PROCEDURE — 74011250636 HC RX REV CODE- 250/636: Performed by: SPECIALIST

## 2023-02-15 RX ORDER — GLYCOPYRROLATE 0.2 MG/ML
INJECTION INTRAMUSCULAR; INTRAVENOUS AS NEEDED
Status: DISCONTINUED | OUTPATIENT
Start: 2023-02-15 | End: 2023-02-15 | Stop reason: HOSPADM

## 2023-02-15 RX ORDER — PHENYLEPHRINE HCL IN 0.9% NACL 0.4MG/10ML
SYRINGE (ML) INTRAVENOUS AS NEEDED
Status: DISCONTINUED | OUTPATIENT
Start: 2023-02-15 | End: 2023-02-15 | Stop reason: HOSPADM

## 2023-02-15 RX ORDER — SODIUM CHLORIDE 0.9 % (FLUSH) 0.9 %
5-40 SYRINGE (ML) INJECTION EVERY 8 HOURS
Status: DISCONTINUED | OUTPATIENT
Start: 2023-02-15 | End: 2023-02-15 | Stop reason: HOSPADM

## 2023-02-15 RX ORDER — SODIUM CHLORIDE 9 MG/ML
50 INJECTION, SOLUTION INTRAVENOUS CONTINUOUS
Status: DISCONTINUED | OUTPATIENT
Start: 2023-02-15 | End: 2023-02-15 | Stop reason: HOSPADM

## 2023-02-15 RX ORDER — DEXTROMETHORPHAN/PSEUDOEPHED 2.5-7.5/.8
1.2 DROPS ORAL
Status: DISCONTINUED | OUTPATIENT
Start: 2023-02-15 | End: 2023-02-15 | Stop reason: HOSPADM

## 2023-02-15 RX ORDER — LIDOCAINE HYDROCHLORIDE 20 MG/ML
INJECTION, SOLUTION EPIDURAL; INFILTRATION; INTRACAUDAL; PERINEURAL AS NEEDED
Status: DISCONTINUED | OUTPATIENT
Start: 2023-02-15 | End: 2023-02-15 | Stop reason: HOSPADM

## 2023-02-15 RX ORDER — SODIUM CHLORIDE 0.9 % (FLUSH) 0.9 %
5-40 SYRINGE (ML) INJECTION AS NEEDED
Status: DISCONTINUED | OUTPATIENT
Start: 2023-02-15 | End: 2023-02-15 | Stop reason: HOSPADM

## 2023-02-15 RX ORDER — PROPOFOL 10 MG/ML
INJECTION, EMULSION INTRAVENOUS AS NEEDED
Status: DISCONTINUED | OUTPATIENT
Start: 2023-02-15 | End: 2023-02-15 | Stop reason: HOSPADM

## 2023-02-15 RX ADMIN — SODIUM CHLORIDE 50 ML/HR: 9 INJECTION, SOLUTION INTRAVENOUS at 06:58

## 2023-02-15 RX ADMIN — PROPOFOL 25 MG: 10 INJECTION, EMULSION INTRAVENOUS at 07:33

## 2023-02-15 RX ADMIN — PROPOFOL 50 MG: 10 INJECTION, EMULSION INTRAVENOUS at 07:31

## 2023-02-15 RX ADMIN — PROPOFOL 25 MG: 10 INJECTION, EMULSION INTRAVENOUS at 07:35

## 2023-02-15 RX ADMIN — LIDOCAINE HYDROCHLORIDE 40 MG: 20 INJECTION, SOLUTION EPIDURAL; INFILTRATION; INTRACAUDAL; PERINEURAL at 07:31

## 2023-02-15 RX ADMIN — Medication 80 MCG: at 07:45

## 2023-02-15 RX ADMIN — Medication 80 MCG: at 07:40

## 2023-02-15 RX ADMIN — PROPOFOL 25 MG: 10 INJECTION, EMULSION INTRAVENOUS at 07:37

## 2023-02-15 RX ADMIN — PROPOFOL 20 MG: 10 INJECTION, EMULSION INTRAVENOUS at 07:38

## 2023-02-15 RX ADMIN — Medication 80 MCG: at 07:37

## 2023-02-15 RX ADMIN — PROPOFOL 25 MG: 10 INJECTION, EMULSION INTRAVENOUS at 07:32

## 2023-02-15 RX ADMIN — GLYCOPYRROLATE 0.2 MG: 0.2 INJECTION, SOLUTION INTRAMUSCULAR; INTRAVENOUS at 07:31

## 2023-02-15 RX ADMIN — Medication 80 MCG: at 07:43

## 2023-02-15 NOTE — ROUTINE PROCESS
Carolyn [facility]  1950  [id number]    Situation:  Verbal report received from: KEVIN sainz  Procedure: Procedure(s):  ESOPHAGOGASTRODUODENOSCOPY (EGD)  ESOPHAGOGASTRODUODENAL (EGD) BIOPSY    Background:    Preoperative diagnosis: Vincent's esophagus without dysplasia [K22.70]  Gastroesophageal reflux disease with hiatal hernia [K21.9, K44.9]  Postoperative diagnosis: EGD: Vincent's Esophagus, Hiatal Hernia, Gastritis    :  Dr. Ruthann Bonner  Assistant(s): Endoscopy Technician-1: Belem Pollard  Endoscopy RN-1: Alda Palencia RN    Specimens:   ID Type Source Tests Collected by Time Destination   1 : Gastric BXS Preservative Gastric  Serena Reynolds MD 2/15/2023 0700 Pathology   2 : GE Junction BXS Preservative GE Junction  Serena Reynolds MD 2/15/2023 0732 Pathology     H. Pylori  no    Assessment:  I  Anesthesia gave intra-procedure sedation and medications, see anesthesia flow sheet yes    Intravenous fluids: NS@ KVO     Vital signs stable     Abdominal assessment: round and soft     Recommendation:  Discharge patient per MD order.     Family or Friend   Permission to share finding with family or friend

## 2023-02-15 NOTE — ANESTHESIA POSTPROCEDURE EVALUATION
Procedure(s):  ESOPHAGOGASTRODUODENOSCOPY (EGD)  ESOPHAGOGASTRODUODENAL (EGD) BIOPSY. total IV anesthesia    Anesthesia Post Evaluation        Patient location during evaluation: PACU  Note status: Adequate. Level of consciousness: responsive to verbal stimuli and sleepy but conscious  Pain management: satisfactory to patient  Airway patency: patent  Anesthetic complications: no  Cardiovascular status: acceptable  Respiratory status: acceptable  Hydration status: acceptable  Comments: +Post-Anesthesia Evaluation and Assessment    Patient: Carolyn Whiteside MRN: [id number]  SSN: [id number]   YOB: 1950  Age: 67 y.o. Sex: male      Cardiovascular Function/Vital Signs    BP (!) 89/62   Pulse 90   Temp 36.7 °C (98 °F)   Resp 19   Ht 5' 7\" (1.702 m)   Wt 73.1 kg (161 lb 3.2 oz)   SpO2 96%   BMI 25.25 kg/m²     Patient is status post Procedure(s):  ESOPHAGOGASTRODUODENOSCOPY (EGD)  ESOPHAGOGASTRODUODENAL (EGD) BIOPSY. Nausea/Vomiting: Controlled. Postoperative hydration reviewed and adequate. Pain:  Pain Scale 1: Numeric (0 - 10) (02/15/23 0754)  Pain Intensity 1: 0 (02/15/23 0754)   Managed. Neurological Status: At baseline. Mental Status and Level of Consciousness: Arousable. Pulmonary Status:   O2 Device: None (Room air) (02/15/23 0754)   Adequate oxygenation and airway patent. Complications related to anesthesia: None    Post-anesthesia assessment completed. No concerns. Signed By: Jordan Botello MD    2/15/2023  Post anesthesia nausea and vomiting:  controlled      INITIAL Post-op Vital signs:   Vitals Value Taken Time   BP 89/66 02/15/23 0800   Temp     Pulse 78 02/15/23 0803   Resp 16 02/15/23 0803   SpO2 96 % 02/15/23 0803   Vitals shown include unvalidated device data.

## 2023-02-15 NOTE — PROGRESS NOTES
Endoscopy Case End Note:     Procedure scope was pre-cleaned, per protocol, at bedside by Rossy Carlson. Report received from anesthesia. See anesthesia flowsheet for intra-procedure vital signs and events. Glasses returned to patient. Belongings remain under stretcher with patient.

## 2023-02-15 NOTE — H&P
Gastroenterology Outpatient History and Physical    Patient: Yari Martin    Physician: Easton Mascorro MD    Vital Signs: Blood pressure 115/72, pulse 68, temperature 98 °F (36.7 °C), resp. rate 20, height 5' 7\" (1.702 m), weight 73.1 kg (161 lb 3.2 oz), SpO2 100 %.     Allergies: No Known Allergies    Chief Complaint: Vincent's Esophagus    History of Present Illness: above    Justification for Procedure: above    History:  Past Medical History:   Diagnosis Date    Acid indigestion     Calculus of kidney     GERD (gastroesophageal reflux disease)     Hernia, inguinal, left     History of Vincent's esophagus 1/10/2017    In the past, none 2014    History of kidney stones 2012    has surgery 12/2012 and 3/2016    [place] (hard of hearing)     bilateral hearing aides    Osteopenia     Dr Carmella Knowles gives him Prolia q6 months    Screen for colon cancer 12/5/2019    Seizures ([address]     after syncopal episodes; as of 1/9/17 pt states this was \"4-5 years ago\"    Syncope, vasovagal     as of 1/9/17 pt states this was \"4-5 years ago\"      Past Surgical History:   Procedure Laterality Date    COLONOSCOPY N/A 12/5/2019    COLONOSCOPY performed by Tao Hammer MD at [address]    HX CATARACT REMOVAL Bilateral 2015    [address]    right inguinal    HX HERNIA REPAIR Left     HX KYPHOPLASTY  2011    T6-T9    HX LITHOTRIPSY  12/2012, 3/2016    HX ORTHOPAEDIC  2011    kyphoplasty t9-t11    HX RETINAL DETACHMENT REPAIR Right 1981    HX RETINAL DETACHMENT REPAIR Left 2015    MN COLON CA SCRN NOT HI RSK IND  12/5/2019         MN UNLISTED NEUROLOGICAL/NEUROMUSCULAR DX PX Right 06/03/2013    L4-5 Microdiscectomy    UPPER GI ENDOSCOPY,BIOPSY  3/6/2020           Social History     Socioeconomic History    Marital status:    Tobacco Use    Smoking status: Former    Smokeless tobacco: Never    Tobacco comments:     As a teen    Vaping Use    Vaping Use: Never used   Substance and Sexual Activity    Alcohol use: Not Currently    Drug use: No      Family History   Problem Relation Age of Onset    Heart Disease Father     Malignant Hyperthermia Neg Hx     Pseudocholinesterase Deficiency Neg Hx     Delayed Awakening Neg Hx     Post-op Nausea/Vomiting Neg Hx     Emergence Delirium Neg Hx     Post-op Cognitive Dysfunction Neg Hx        Medications:   Prior to Admission medications    Medication Sig Start Date End Date Taking? Authorizing Provider   zinc 50 mg tab tablet Take 50 mg by mouth daily. Yes Provider, Historical   ascorbic acid, vitamin C, (VITAMIN C) 1,000 mg tablet Take 1,000 mg by mouth. Yes Provider, Historical   aspirin 81 mg chewable tablet Take 81 mg by mouth daily. Yes Provider, Historical   famotidine (PEPCID) 20 mg tablet Take 20 mg by mouth nightly. Yes Provider, Historical   lovastatin (MEVACOR) 20 mg tablet Take 20 mg by mouth nightly. Yes Provider, Historical   potassium 99 mg tablet Take 99 mg by mouth daily. Yes Provider, Historical   omeprazole (PRILOSEC) 20 mg capsule Take 20 mg by mouth every morning. Yes Provider, Historical   multivitamin (ONE A DAY) tablet Take 1 Tab by mouth daily. Yes Provider, Historical   calcium citrate-vitamin D3 (CITRACAL WITH VITAMIN D MAXIMUM) tablet Take 1 Tab by mouth daily. Yes Provider, Historical   cholecalciferol (VITAMIN D3) 400 unit tab tablet Take 400 Units by mouth two (2) times a day. Yes Provider, Historical   denosumab (PROLIA) 60 mg/mL injection 60 mg by SubCUTAneous route. q6 months - 12/2016    Provider, Historical       Physical Exam:   General: alert, no distress   HEENT: Head: Normocephalic, no lesions, without obvious abnormality.    Heart: regular rate and rhythm, S1, S2 normal, no murmur, click, rub or gallop   Lungs: chest clear, no wheezing, rales, normal symmetric air entry   Abdominal: Soft, NT/ND, Bowel sounds are normal, liver is not enlarged, spleen is not enlarged   Neurological: Grossly normal Extremities: extremities normal, atraumatic, no cyanosis or edema     Findings/Diagnosis: Vincent's Esophagus    Plan of Care/Planned Procedure: EGD

## 2023-02-15 NOTE — ANESTHESIA PREPROCEDURE EVALUATION
Anesthetic History   No history of anesthetic complications            Review of Systems / Medical History  Patient summary reviewed, nursing notes reviewed and pertinent labs reviewed    Pulmonary          Smoker      Comments: Smoking Status: Former   Neuro/Psych     seizures (associated with vasovagal episodes only (dehydration))         Cardiovascular              Hyperlipidemia    Exercise tolerance: >4 METS  Comments: Denies hrt probs or chest pain   GI/Hepatic/Renal     GERD: well controlled    Renal disease: stones      Comments: Hx Vincent's Endo/Other  Within defined limits           Other Findings   Comments: [place]  Osteopenia            Physical Exam    Airway  Mallampati: I  TM Distance: 4 - 6 cm  Neck ROM: normal range of motion   Mouth opening: Normal     Cardiovascular  Regular rate and rhythm,  S1 and S2 normal,  no murmur, click, rub, or gallop             Dental  No notable dental hx       Pulmonary  Breath sounds clear to auscultation               Abdominal  GI exam deferred       Other Findings            Anesthetic Plan    ASA: 2  Anesthesia type: total IV anesthesia          Induction: Intravenous  Anesthetic plan and risks discussed with: Patient

## 2023-02-15 NOTE — DISCHARGE INSTRUCTIONS
Eddie Fuentes  [id number]  1950    EGD DISCHARGE INSTRUCTIONS  Discomfort:  Sore throat- throat lozenges or warm salt water gargle  redness at IV site- apply warm compress to area; if redness or soreness persist- contact your physician  Gaseous discomfort- walking, belching will help relieve any discomfort  You may not operate a vehicle for 12 hours  You may not engage in an occupation involving machinery or appliances for rest of today. You may not drink alcoholic beverages for at least 12 hours  Avoid making any critical decisions for at least 24 hour  DIET  You may resume your regular diet - however -  remember your colon is empty and a heavy meal will produce gas. Avoid these foods:  vegetables, fried / greasy foods, carbonated drinks  MEDICATIONS        Regarding Aspirin or Nonsteroidal medications specifically, please see below. ACTIVITY  You may resume your normal daily activities. Spend the remainder of the day resting -  avoid any strenuous activity. CALL M.D. ANY SIGN OF   Increasing pain, nausea, vomiting  Abdominal distension (swelling)  New increased bleeding (oral or rectal)  Fever (chills)  Pain in chest area  Bloody discharge from nose or mouth  Shortness of breath  Tylenol as needed for pain.     Follow-up Instructions:   Call Dr. Charlotte Montes for results of procedure / biopsy in 4-5 days at telephone #  [phone]              Repeat EGD in 3 years     Patient Education on Sedation / Analgesia Administered for Procedure      For 24 hours after general anesthesia or intravenous analgesia / sedation:  Have someone responsible help you with your care  Limit your activities  Do not drive and operate hazardous machinery  Do not make important personal, legal or business decisions  Do not drink alcoholic beverages  If you have not urinated within 8 hours after discharge, please contact your physician  Resume your medications unless otherwise instructed    For 24 hours after general anesthesia or intravenous analgesia / sedation  you may experience:  Drowsiness, dizziness, sleepiness, or confusion  Difficulty remembering or delayed reaction times  Difficulty with your balance, especially while walking, move slowly and carefully, do not make sudden position changes  Difficulty focusing or blurred vision    You may not be aware of slight changes in your behavior and/or your reaction time because of the medication used during and after your procedure. Report the following to your physician:  Excessive pain, swelling, redness or odor of or around the surgical area  Temperature over 100.5  Nausea and vomiting lasting longer than 4 hours or if unable to take medications  Any signs of decreased circulation or nerve impairment to extremity: change in color, persistent numbness, tingling, coldness or increase pain  Any questions or concerns    IF YOU REPORT TO AN EMERGENCY ROOM, DOCTOR'S OFFICE OR HOSPITAL WITHIN 24 HOURS AFTER YOUR PROCEDURE, BRING THIS SHEET AND YOUR AFTER VISIT SUMMARY WITH YOU AND GIVE IT TO THE PHYSICIAN OR NURSE ATTENDING YOU. Vincent's Esophagus: Care Instructions  Overview     The esophagus is the tube that connects the throat to the stomach. Food and liquids go through this tube. In Vincent's esophagus, the cells that line the tube change. This is usually because of gastroesophageal reflux disease (GERD). GERD causes acid from your stomach to back up into the esophagus. When you have Vincent's esophagus, you are slightly more likely to get cancer of the esophagus. So regular testing is important to watch for signs of this cancer. You can treat GERD to control your symptoms and feel better. Follow-up care is a key part of your treatment and safety. Be sure to make and go to all appointments, and call your doctor if you are having problems. It's also a good idea to know your test results and keep a list of the medicines you take. How can you care for yourself at home?   Take your medicines exactly as prescribed. Call your doctor if you think you are having a problem with your medicine. If you take over-the-counter medicine, such as antacids or acid reducers, follow all instructions on the label. If you use these medicines often, talk with your doctor. Be careful when you take over-the-counter antacid medicines. Many of these medicines have aspirin in them. Read the label to make sure that you are not taking more than the recommended dose. Too much aspirin can be harmful. Do not smoke or chew tobacco. Smoking can make gastroesophageal reflux disease (GERD) worse. If you need help quitting, talk to your doctor about stop-smoking programs and medicines. These can increase your chances of quitting for good. Avoid foods that make your symptoms worse. These may include chocolate, mint, alcohol, pepper, spicy foods, high-fat foods, or drinks with caffeine in them, such as tea, coffee, melissa, or energy drinks. If your symptoms are worse after you eat a certain food, you may want to stop eating it to see if your symptoms get better. Eat smaller meals, and more often. After eating, wait 2 to 3 hours before you lie down. Raise the head of your bed 6 in. (15 cm) to 8 in. (20 cm) by putting blocks under the frame or a foam wedge under the head of the mattress. Adding extra pillows does not work. Do not wear tight clothing around your midsection. Lose weight if you are overweight or obese. Losing just 5 to 10 pounds can help. Go to regular follow-up testing, even if you don't have symptoms. It helps your doctor watch for signs of more changes that may lead to cancer. When should you call for help? Call 911  anytime you think you may need emergency care. For example, call if:    Food or something sharp is stuck in your esophagus and you can't swallow at all. Call your doctor now or seek immediate medical care if:    You have new or worse belly pain. You are vomiting.    Watch closely for changes in your health, and be sure to contact your doctor if:    You have new or worse symptoms of reflux. You have any pain or trouble swallowing. You are losing weight. You do not get better as expected. Where can you learn more? Go to http://www.gray.com/  Enter L146 in the search box to learn more about \"Vincent's Esophagus: Care Instructions. \"  Current as of: June 6, 2022               Content Version: 13.4  © 2006-2022 [employer]. Care instructions adapted under license by [employer] (which disclaims liability or warranty for this information). If you have questions about a medical condition or this instruction, always ask your healthcare professional. [address] any warranty or liability for your use of this information. Gastritis: Care Instructions  Your Care Instructions     Gastritis is a sore and upset stomach. It happens when something irritates the stomach lining. Many things can cause it. These include an infection such as the flu or something you ate or drank. Medicines or a sore on the lining of the stomach (ulcer) also can cause it. Your belly may bloat and ache. You may belch, vomit, and feel sick to your stomach. You should be able to relieve the problem by taking medicine. And it may help to change your diet. If gastritis lasts, your doctor may prescribe medicine. Follow-up care is a key part of your treatment and safety. Be sure to make and go to all appointments, and call your doctor if you are having problems. It's also a good idea to know your test results and keep a list of the medicines you take. How can you care for yourself at home? If your doctor prescribed antibiotics, take them as directed. Do not stop taking them just because you feel better. You need to take the full course of antibiotics. Be safe with medicines.  If your doctor prescribed medicine to decrease stomach acid, take it as directed. Call your doctor if you think you are having a problem with your medicine. Do not take any other medicine, including over-the-counter pain relievers, without talking to your doctor first.  If your doctor recommends over-the-counter medicine to reduce stomach acid, such as Pepcid AC (famotidine), Prilosec (omeprazole), or Tagamet HB (cimetidine) follow the directions on the label. Drink plenty of fluids to prevent dehydration. Choose water and other clear liquids. If you have kidney, heart, or liver disease and have to limit fluids, talk with your doctor before you increase the amount of fluids you drink. Avoid foods that make your symptoms worse. These may include chocolate, mint, alcohol, pepper, spicy foods, high-fat foods, or drinks with caffeine in them, such as tea, coffee, melissa, or energy drinks. If your symptoms are worse after you eat a certain food, you may want to stop eating it to see if your symptoms get better. When should you call for help? Call 911 anytime you think you may need emergency care. For example, call if:    You vomit blood or what looks like coffee grounds. You pass maroon or very bloody stools. Call your doctor now or seek immediate medical care if:    You start breathing fast and have not produced urine in the last 8 hours. You cannot keep fluids down. Watch closely for changes in your health, and be sure to contact your doctor if:    You do not get better as expected. Where can you learn more? Go to http://www.[employer].com/  Enter Z536 in the search box to learn more about \"Gastritis: Care Instructions. \"  Current as of: June 6, 2022               Content Version: 13.4  © 4686-9816 [employer]. Care instructions adapted under license by [employer] (which disclaims liability or warranty for this information).  If you have questions about a medical condition or this instruction, always ask your healthcare professional. [address] any warranty or liability for your use of this information.

## 2023-02-15 NOTE — PROCEDURES
Esophagogastroduodenoscopy Procedure Note      Sharifa Kumar  1950  [id number]    Indication:  History of Vincent's Esophagus      Endoscopist: Leticia Gonzalez MD    Referring Provider:  Anitha Reynolds MD    Sedation:  MAC anesthesia Propofol    Procedure Details:  After infomed consent was obtained for the procedure, with all risks and benefits of procedure explained the patient was taken to the endoscopy suite and placed in the left lateral decubitus position. Following sequential administration of sedation as per above, the endoscope was inserted into the mouth and advanced under direct vision to second portion of the duodenum. A careful inspection was made as the gastroscope was withdrawn, including a retroflexed view of the proximal stomach; findings and interventions are described below. Findings:     Esophagus:   + There was an irregular Z line with columnar islands located at 35 cm from incisors. S/P Bxs.   + 4 - 5 cm medium sized hiatal hernia    Stomach:   + Diffuse erythema of the stomach c/w gastritis, nonerosive s/p Bxs. Duodenum:   - The bulb and post bulbar mucosa is normal in appearance to the second portion. The duodenal folds appeared normal.      Therapies:  see above    Specimen: Specimens were collected as described and send to the laboratory. Complications:   None were encountered during the procedure. EBL: < 10 ml.           Recommendations:   -F/U Path  -Repeat EGD in 3 years      Leticia Gonzalez MD  2/15/2023  7:40 AM

## 2023-02-28 ENCOUNTER — HOSPITAL ENCOUNTER (OUTPATIENT)
Dept: BONE DENSITY | Age: 73
Discharge: HOME | End: 2023-02-28
Attending: SPECIALIST
Payer: MEDICARE

## 2023-02-28 DIAGNOSIS — M81.0 OSTEOPOROSIS: ICD-10-CM

## 2023-02-28 PROCEDURE — 77080 DXA BONE DENSITY AXIAL: CPT

## 2023-05-21 RX ORDER — OMEPRAZOLE 20 MG/1
20 CAPSULE, DELAYED RELEASE ORAL
COMMUNITY

## 2023-05-21 RX ORDER — THIAMINE HCL 100 MG
1 TABLET ORAL DAILY
COMMUNITY

## 2023-05-21 RX ORDER — OMEGA-3S/DHA/EPA/FISH OIL/D3 300MG-1000
400 CAPSULE ORAL 2 TIMES DAILY
COMMUNITY

## 2023-05-21 RX ORDER — ASPIRIN 81 MG/1
81 TABLET, CHEWABLE ORAL DAILY
COMMUNITY

## 2023-05-21 RX ORDER — FAMOTIDINE 20 MG/1
20 TABLET, FILM COATED ORAL NIGHTLY
COMMUNITY

## 2023-05-21 RX ORDER — LOVASTATIN 20 MG/1
20 TABLET ORAL NIGHTLY
COMMUNITY

## 2023-05-23 PROBLEM — M81.0 OSTEOPOROSIS: Status: ACTIVE | Noted: 2023-05-23

## 2023-05-23 PROBLEM — Z79.899 MEDICATION MANAGEMENT: Status: ACTIVE | Noted: 2023-05-23

## 2023-05-30 ENCOUNTER — APPOINTMENT (OUTPATIENT)
Dept: INFUSION THERAPY | Age: 73
End: 2023-05-30

## 2023-05-30 ENCOUNTER — HOSPITAL ENCOUNTER (OUTPATIENT)
Facility: HOSPITAL | Age: 73
Setting detail: INFUSION SERIES
End: 2023-05-30

## 2023-05-30 DIAGNOSIS — M81.0 OSTEOPOROSIS, UNSPECIFIED OSTEOPOROSIS TYPE, UNSPECIFIED PATHOLOGICAL FRACTURE PRESENCE: ICD-10-CM

## 2023-05-30 DIAGNOSIS — Z79.899 MEDICATION MANAGEMENT: Primary | ICD-10-CM

## 2023-06-05 ENCOUNTER — HOSPITAL ENCOUNTER (OUTPATIENT)
Facility: HOSPITAL | Age: 73
Setting detail: INFUSION SERIES
End: 2023-06-05
Payer: MEDICARE

## 2023-06-05 VITALS
TEMPERATURE: 97.6 F | SYSTOLIC BLOOD PRESSURE: 118 MMHG | DIASTOLIC BLOOD PRESSURE: 64 MMHG | RESPIRATION RATE: 17 BRPM | HEART RATE: 58 BPM | OXYGEN SATURATION: 98 %

## 2023-06-05 DIAGNOSIS — M81.0 OSTEOPOROSIS, UNSPECIFIED OSTEOPOROSIS TYPE, UNSPECIFIED PATHOLOGICAL FRACTURE PRESENCE: ICD-10-CM

## 2023-06-05 DIAGNOSIS — Z79.899 MEDICATION MANAGEMENT: Primary | ICD-10-CM

## 2023-06-05 PROCEDURE — 6360000002 HC RX W HCPCS: Performed by: SPECIALIST

## 2023-06-05 PROCEDURE — 96372 THER/PROPH/DIAG INJ SC/IM: CPT

## 2023-06-05 RX ADMIN — DENOSUMAB 60 MG: 60 INJECTION SUBCUTANEOUS at 11:11

## 2023-06-05 NOTE — PROGRESS NOTES
[facility] Visit Note:  Arrived - 1100 for Prolia    Patient denied having any symptoms of COVID-19, i.e. SOB, coughing, fever, or generally not feeling well. /64   Pulse 58   Temp 97.6 °F (36.4 °C)   Resp 17   SpO2 98%     Assessment - unchanged. Reviewed Prolia info. Pt denies any recent or upcoming dental work. Labs obtained:   Patient had lab work done 5/16 and scanned into chart- Calcium 9.6    Medication given:  Prolia 60mg SQ in left arm    1111 - Tolerated well. No reaction noted. Reviewed Prolia D/C instructions. Verbalized understanding. Pt denies any acute problems/changes. Discharged from [address] [place] [address] ambulatory. No distress. Next appt:    Future Appointments   Date Time Provider Daniel Monson   12/4/2023 11:00 AM [address] [facility]

## 2023-12-04 ENCOUNTER — HOSPITAL ENCOUNTER (OUTPATIENT)
Facility: HOSPITAL | Age: 73
Setting detail: INFUSION SERIES
Discharge: HOME | End: 2023-12-04
Payer: MEDICARE

## 2023-12-04 VITALS
HEART RATE: 60 BPM | DIASTOLIC BLOOD PRESSURE: 61 MMHG | TEMPERATURE: 98 F | SYSTOLIC BLOOD PRESSURE: 106 MMHG | RESPIRATION RATE: 16 BRPM | OXYGEN SATURATION: 99 %

## 2023-12-04 DIAGNOSIS — M81.0 OSTEOPOROSIS, UNSPECIFIED OSTEOPOROSIS TYPE, UNSPECIFIED PATHOLOGICAL FRACTURE PRESENCE: Primary | ICD-10-CM

## 2023-12-04 DIAGNOSIS — Z79.899 MEDICATION MANAGEMENT: ICD-10-CM

## 2023-12-04 PROCEDURE — 6360000002 HC RX W HCPCS: Performed by: SPECIALIST

## 2023-12-04 PROCEDURE — 96372 THER/PROPH/DIAG INJ SC/IM: CPT

## 2023-12-04 RX ADMIN — DENOSUMAB 60 MG: 60 INJECTION SUBCUTANEOUS at 12:01

## 2024-06-03 ENCOUNTER — HOSPITAL ENCOUNTER (OUTPATIENT)
Facility: HOSPITAL | Age: 74
Setting detail: INFUSION SERIES
Discharge: HOME | End: 2024-06-03
Payer: MEDICARE

## 2024-06-03 VITALS
HEART RATE: 60 BPM | TEMPERATURE: 97.9 F | DIASTOLIC BLOOD PRESSURE: 69 MMHG | SYSTOLIC BLOOD PRESSURE: 103 MMHG | HEIGHT: 67 IN | WEIGHT: 160 LBS | OXYGEN SATURATION: 97 % | BODY MASS INDEX: 25.11 KG/M2 | RESPIRATION RATE: 16 BRPM

## 2024-06-03 DIAGNOSIS — Z79.899 MEDICATION MANAGEMENT: Primary | ICD-10-CM

## 2024-06-03 DIAGNOSIS — M81.0 OSTEOPOROSIS, UNSPECIFIED OSTEOPOROSIS TYPE, UNSPECIFIED PATHOLOGICAL FRACTURE PRESENCE: ICD-10-CM

## 2024-06-03 PROCEDURE — 96372 THER/PROPH/DIAG INJ SC/IM: CPT

## 2024-06-03 PROCEDURE — 6360000002 HC RX W HCPCS: Performed by: NURSE PRACTITIONER

## 2024-06-03 RX ADMIN — DENOSUMAB 60 MG: 60 INJECTION SUBCUTANEOUS at 11:36

## 2024-06-03 NOTE — PROGRESS NOTES
Pt arrived at [facility] ambulatory and in no acute distress for Prolia injection. Assessment complete. Pt denies any questions or concerns, also denies any dental work. Labs complete 05/20/2024; Calcium 9.7    Patient Vitals for the past 12 hrs:   Temp Pulse Resp BP SpO2   06/03/24 1112 97.9 °F (36.6 °C) 60 16 103/69 97 %       Medications Administered         denosumab (PROLIA) SC injection 60 mg Admin Date  06/03/2024 Action  Given Dose  60 mg Route  SubCUTAneous Administered By  Katherine Hdz, RN            Pt tolerated injection well, no adverse reaction noted. D/Cd from [facility] ambulatory and in no acute distress. Pt aware of next appointment.     Future Appointments   Date Time Provider Department Center   12/2/2024 11:00 AM [facility]

## 2024-12-02 ENCOUNTER — HOSPITAL ENCOUNTER (OUTPATIENT)
Facility: HOSPITAL | Age: 74
Setting detail: INFUSION SERIES
Discharge: HOME | End: 2024-12-02
Payer: MEDICARE

## 2024-12-02 VITALS
WEIGHT: 160 LBS | BODY MASS INDEX: 25.11 KG/M2 | HEART RATE: 54 BPM | HEIGHT: 67 IN | RESPIRATION RATE: 16 BRPM | DIASTOLIC BLOOD PRESSURE: 67 MMHG | SYSTOLIC BLOOD PRESSURE: 102 MMHG | TEMPERATURE: 96.8 F | OXYGEN SATURATION: 98 %

## 2024-12-02 DIAGNOSIS — M81.0 OSTEOPOROSIS, UNSPECIFIED OSTEOPOROSIS TYPE, UNSPECIFIED PATHOLOGICAL FRACTURE PRESENCE: ICD-10-CM

## 2024-12-02 DIAGNOSIS — Z79.899 MEDICATION MANAGEMENT: Primary | ICD-10-CM

## 2024-12-02 PROCEDURE — 6360000002 HC RX W HCPCS: Performed by: NURSE PRACTITIONER

## 2024-12-02 PROCEDURE — 96372 THER/PROPH/DIAG INJ SC/IM: CPT

## 2024-12-02 RX ADMIN — DENOSUMAB 60 MG: 60 INJECTION SUBCUTANEOUS at 11:29

## 2024-12-02 NOTE — PROGRESS NOTES
[facility] Visit Note:  Arrived for Prolia,   Assessment unremarkable, patient very pleasant and arrived with labs from earlier this month showing calcium WNL, Labs scanned in. Reviewed Prolia info. Pt denies any recent or upcoming dental work.      /67   Pulse 54   Temp 96.8 °F (36 °C) (Temporal)   Resp 16   Ht 1.702 m (5' 7\")   Wt 72.6 kg (160 lb)   SpO2 98%   BMI 25.06 kg/m²       Medication given:  Prolia 60mg SQ in left arm    Tolerated well. No reaction noted. Reviewed Prolia D/C instructions. Verbalized understanding. Pt denies any acute problems/changes. Discharged from [facility] ambulatory. No distress. Next appt: 6 months.

## 2025-03-06 ENCOUNTER — TRANSCRIBE ORDERS (OUTPATIENT)
Facility: HOSPITAL | Age: 75
End: 2025-03-06

## 2025-03-06 DIAGNOSIS — M81.0 OSTEOPOROSIS, UNSPECIFIED OSTEOPOROSIS TYPE, UNSPECIFIED PATHOLOGICAL FRACTURE PRESENCE: Primary | ICD-10-CM

## 2025-03-12 ENCOUNTER — HOSPITAL ENCOUNTER (OUTPATIENT)
Facility: HOSPITAL | Age: 75
Discharge: HOME | End: 2025-03-15
Attending: SPECIALIST
Payer: MEDICARE

## 2025-03-12 DIAGNOSIS — M81.0 OSTEOPOROSIS, UNSPECIFIED OSTEOPOROSIS TYPE, UNSPECIFIED PATHOLOGICAL FRACTURE PRESENCE: ICD-10-CM

## 2025-03-12 PROCEDURE — 77080 DXA BONE DENSITY AXIAL: CPT

## 2025-06-05 ENCOUNTER — HOSPITAL ENCOUNTER (OUTPATIENT)
Facility: HOSPITAL | Age: 75
Setting detail: INFUSION SERIES
Discharge: HOME | End: 2025-06-05
Payer: MEDICARE

## 2025-06-05 VITALS
RESPIRATION RATE: 18 BRPM | HEART RATE: 57 BPM | SYSTOLIC BLOOD PRESSURE: 106 MMHG | DIASTOLIC BLOOD PRESSURE: 63 MMHG | TEMPERATURE: 97.9 F | OXYGEN SATURATION: 98 %

## 2025-06-05 DIAGNOSIS — M81.0 OSTEOPOROSIS, UNSPECIFIED OSTEOPOROSIS TYPE, UNSPECIFIED PATHOLOGICAL FRACTURE PRESENCE: ICD-10-CM

## 2025-06-05 DIAGNOSIS — Z79.899 MEDICATION MANAGEMENT: Primary | ICD-10-CM

## 2025-06-05 PROCEDURE — 96372 THER/PROPH/DIAG INJ SC/IM: CPT

## 2025-06-05 PROCEDURE — 6360000002 HC RX W HCPCS: Performed by: SPECIALIST

## 2025-06-05 RX ADMIN — DENOSUMAB 60 MG: 60 INJECTION SUBCUTANEOUS at 10:34

## 2025-06-05 NOTE — PROGRESS NOTES
[facility] Visit Note:  Arrived - 1000 for Prolia    Patient denied having any symptoms of COVID-19, i.e. SOB, coughing, fever, or generally not feeling well.      /63   Pulse 57   Temp 97.9 °F (36.6 °C) (Temporal)   Resp 18   SpO2 98%     Assessment - unchanged. Reviewed Prolia info. Pt denies any recent or upcoming dental work.    Patient arrived with lab results from 5/12 drawn at Endocrinologist's office. Labs scanned into EPIC and results are within treatment parameters. (Calcium is 9.8)    Medications Administered         denosumab (PROLIA) SC injection 60 mg Admin Date  06/05/2025 Action  Given Dose  60 mg Route  SubCUTAneous Documented By  Flavia Medina, RN             Patient tolerated well; No reaction noted. Pt denies any acute problems/changes. Discharged from [facility] ambulatory and in no distress. Next appt: 12/4/2025.

## (undated) DEVICE — SET ADMIN 16ML TBNG L100IN 2 Y INJ SITE IV PIGGY BK DISP (ORDER IN MULIPLES OF 48)

## (undated) DEVICE — SYRINGE MED 10ML LUERLOCK TIP W/O SFTY DISP

## (undated) DEVICE — Z DISCONTINUED PER MEDLINE LINE GAS SAMPLING O2/CO2 LNG AD 13 FT NSL W/ TBNG FILTERLINE

## (undated) DEVICE — CATH IV AUTOGRD BC PNK 20GA 25 -- INSYTE

## (undated) DEVICE — Device

## (undated) DEVICE — BLADE ASSEMB CLP HAIR FINE --

## (undated) DEVICE — NEONATAL-ADULT SPO2 SENSOR: Brand: NELLCOR

## (undated) DEVICE — SUTURE STRATAFIX SPRL PDS + SZ 2-0 L6IN ABSRB VLT L36MM SXPP1B409

## (undated) DEVICE — SOLIDIFIER FLD 2OZ 1500CC N DISINF IN BTL DISP SAFESORB

## (undated) DEVICE — (D)PREP SKN CHLRAPRP APPL 26ML -- CONVERT TO ITEM 371833

## (undated) DEVICE — VISUALIZATION SYSTEM: Brand: CLEARIFY

## (undated) DEVICE — DRAPE,REIN 53X77,STERILE: Brand: MEDLINE

## (undated) DEVICE — 1200 GUARD II KIT W/5MM TUBE W/O VAC TUBE: Brand: GUARDIAN

## (undated) DEVICE — MEGA NEEDLE DRIVER: Brand: ENDOWRIST;DAVINCI SI

## (undated) DEVICE — ELECTRO LUBE IS A SINGLE PATIENT USE DEVICE THAT IS INTENDED TO BE USED ON ELECTROSURGICAL ELECTRODES TO REDUCE STICKING.: Brand: KEY SURGICAL ELECTRO LUBE

## (undated) DEVICE — 3M™ IOBAN™ 2 ANTIMICROBIAL INCISE DRAPE 6650EZ: Brand: IOBAN™ 2

## (undated) DEVICE — SUTURE MCRYL SZ 4-0 L27IN ABSRB UD L19MM PS-2 1/2 CIR PRIM Y426H

## (undated) DEVICE — DERMABOND SKIN ADH 0.7ML -- DERMABOND ADVANCED 12/BX

## (undated) DEVICE — SYRINGE 50ML E/T

## (undated) DEVICE — SYR 10ML LUER LOK 1/5ML GRAD --

## (undated) DEVICE — FORCEPS BX L160CM DIA8MM GRSP DISECT CUP TIP NONLOCKING ROT

## (undated) DEVICE — REM POLYHESIVE ADULT PATIENT RETURN ELECTRODE: Brand: VALLEYLAB

## (undated) DEVICE — BLOCK BITE ENDOSCP AD 21 MM W/ DIL BLU LF DISP

## (undated) DEVICE — HANDLE LT SNAP ON ULT DURABLE LENS FOR TRUMPF ALC DISPOSABLE

## (undated) DEVICE — TOWEL 4 PLY TISS 19X30 SUE WHT

## (undated) DEVICE — KENDALL DL ECG CABLE AND LEAD WIRE SYSTEM, 3-LEAD, SINGLE PATIENT USE: Brand: KENDALL

## (undated) DEVICE — NEEDLE HYPO 22GA L1.5IN BLK S STL HUB POLYPR SHLD REG BVL

## (undated) DEVICE — SOLIDIFIER MEDC 1200ML -- CONVERT TO 356117

## (undated) DEVICE — BITE BLK ENDOSCP AD 54FR GRN POLYETH ENDOSCP W STRP SLD

## (undated) DEVICE — SET ADMIN 16ML TBNG L100IN 2 Y INJ SITE IV PIGGY BK DISP

## (undated) DEVICE — BAG SPEC BIOHZRD 10 X 10 IN --

## (undated) DEVICE — CONTAINER SPEC 20 ML LID NEUT BUFF FORMALIN 10 % POLYPR STS

## (undated) DEVICE — BASIN EMSIS 16OZ GRAPHITE PLAS KID SHP MOLD GRAD FOR ORAL

## (undated) DEVICE — MONOPOLAR CURVED SCISSORS: Brand: ENDOWRIST

## (undated) DEVICE — SOLUTION IV 1000ML 0.9% SOD CHL

## (undated) DEVICE — KIT INFECTION CTRL ST FRAN --

## (undated) DEVICE — HYPODERMIC SAFETY NEEDLE: Brand: MAGELLAN

## (undated) DEVICE — NEEDLE HYPO 18GA L1.5IN PNK S STL HUB POLYPR SHLD REG BVL

## (undated) DEVICE — TIP COVER ACCESSORY

## (undated) DEVICE — TROCAR ENDOSCP L100MM DIA5MM BLDELSS STBL SL THRD OPT VW

## (undated) DEVICE — SYR 3ML LL TIP 1/10ML GRAD --

## (undated) DEVICE — ELECTRODE,RADIOTRANSLUCENT,FOAM,5PK: Brand: MEDLINE

## (undated) DEVICE — SEAL CANN F/12MM 8.5-13MM DISP -- DA VINCI

## (undated) DEVICE — KIT DRP 3 ARM ACC DISP ENDOWRIST DA VINCI SI

## (undated) DEVICE — SURGICAL PROCEDURE KIT GEN LAPAROSCOPY LF

## (undated) DEVICE — SOLUTION LACTATED RINGERS INJECTION USP

## (undated) DEVICE — CONTINU-FLO SOLUTION SET, 2 INJECTION SITES, MALE LUER LOCK ADAPTER WITH RETRACTABLE COLLAR, LARGE BORE STOPCOCK WITH ROTATING MALE LUER LOCK EXTENSION SET, 2 INJECTION SITES, MALE LUER LOCK ADAPTER WITH RETRACTABLE COLLAR: Brand: INTERLINK/CONTINU-FLO

## (undated) DEVICE — KENDALL SCD EXPRESS SLEEVES, KNEE LENGTH, MEDIUM: Brand: KENDALL SCD

## (undated) DEVICE — YANKAUER,TAPERED BULBOUS TIP,W/O VENT: Brand: MEDLINE